# Patient Record
Sex: FEMALE | Race: WHITE | NOT HISPANIC OR LATINO | Employment: OTHER | URBAN - METROPOLITAN AREA
[De-identification: names, ages, dates, MRNs, and addresses within clinical notes are randomized per-mention and may not be internally consistent; named-entity substitution may affect disease eponyms.]

---

## 2018-03-01 ENCOUNTER — HOSPITAL ENCOUNTER (OUTPATIENT)
Dept: OUTPATIENT SERVICES | Facility: HOSPITAL | Age: 68
Discharge: HOME | End: 2018-03-01
Attending: ORTHOPAEDIC SURGERY | Admitting: ORTHOPAEDIC SURGERY

## 2018-03-06 ENCOUNTER — HOSPITAL ENCOUNTER (OUTPATIENT)
Dept: PHYSICAL THERAPY | Facility: CLINIC | Age: 68
Setting detail: THERAPIES SERIES
Discharge: HOME | End: 2018-03-06
Attending: INTERNAL MEDICINE
Payer: MEDICARE

## 2018-03-06 DIAGNOSIS — R26.9 GAIT DISORDER: Primary | ICD-10-CM

## 2018-03-06 PROCEDURE — 97010 HOT OR COLD PACKS THERAPY: CPT | Mod: GP

## 2018-03-06 PROCEDURE — 97110 THERAPEUTIC EXERCISES: CPT | Mod: GP

## 2018-03-06 PROCEDURE — 97140 MANUAL THERAPY 1/> REGIONS: CPT | Mod: GP

## 2018-03-06 RX ORDER — ROSUVASTATIN CALCIUM 10 MG/1
10 TABLET, COATED ORAL DAILY
COMMUNITY
End: 2023-05-11

## 2018-03-06 NOTE — PROGRESS NOTES
Patient: Juana Bagley  [facility]    MRN: [id number]  Today's date: 3/6/2018          S: Began water therapy. One hour with only c/o fatigue, no pain  Called Fabian Cary and await call back regarding shoe lift          Treatment Summary - 03/06/18 1400        Session Details    Document Type daily treatment/progress note    Mode of Treatment individual therapy       Time Calculation    Start Time 1400    Stop Time 1500    Time Calculation (min) 60 min        O:TE 35 mins (no group)  Bike 10 mins  Calf S 3x  Ham S 3x  St ABD with orange 20x  St EXT with orange 20x  Supine clam Pink 5 sec x20  Supine ADD 10  Hip ER S 3x  Butterfly S 3x  Sam S  (with PPT) 3x  PROM: left hip, right leg distraction 8 mins  CP to left hip and L/S spine 10 mins  A: Patient with continued increased ADL  P: Cont 2x a week, check on heel lift progress

## 2018-03-08 ENCOUNTER — HOSPITAL ENCOUNTER (OUTPATIENT)
Dept: PHYSICAL THERAPY | Facility: CLINIC | Age: 68
Setting detail: THERAPIES SERIES
Discharge: HOME | End: 2018-03-08
Attending: INTERNAL MEDICINE
Payer: MEDICARE

## 2018-03-08 DIAGNOSIS — R26.9 GAIT DISORDER: Primary | ICD-10-CM

## 2018-03-08 PROCEDURE — 97110 THERAPEUTIC EXERCISES: CPT | Mod: GP

## 2018-03-08 PROCEDURE — 97140 MANUAL THERAPY 1/> REGIONS: CPT | Mod: GP

## 2018-03-08 PROCEDURE — 97010 HOT OR COLD PACKS THERAPY: CPT | Mod: GP

## 2018-03-08 NOTE — PROGRESS NOTES
"Patient: Juana Bagley  [facility]    MRN: [id number]  Today's date: 3/8/2018       S: I called Fabian Cary and await call back. Think there is something wrong the answering machine. I called yesterday. No c/o pain today. I can walk reciprocally up half of my steps (10) then need to switch to step to gait for the second half. This is usually when I am tired. I went to the pool today and am here now. It is not too much in the same day.             Treatment Summary - 03/08/18 1500        Session Details    Document Type daily treatment/progress note    Mode of Treatment individual therapy       Time Calculation    Start Time 1500    Stop Time 1600    Time Calculation (min) 60 min                     THERE EX 35 mins  Bike 10 mins  Calf S 3x  Ham S 3x  St ABD with green 20x  St EXT green 20x  Fwd step ups 6\" 10x  Supine clam green x20  Supine ADD 10x  Hip ER S 3x  Butterfly S 3x  Sam S  (with PPT) 3x    Manuals: 10 mins  PROM: left hip, right leg distraction     CP to left hip and L/S spine 10 mins    A: Increased standing Tband to the next color, green, without c/c.Additionally, increased supine clam to green without pain.     P: Cont 2x a week, check on heel lift progress  "

## 2018-03-13 ENCOUNTER — HOSPITAL ENCOUNTER (OUTPATIENT)
Dept: PHYSICAL THERAPY | Facility: CLINIC | Age: 68
Setting detail: THERAPIES SERIES
Discharge: HOME | End: 2018-03-13
Attending: INTERNAL MEDICINE
Payer: MEDICARE

## 2018-03-13 DIAGNOSIS — M16.12 UNILATERAL PRIMARY OSTEOARTHRITIS, LEFT HIP: Primary | ICD-10-CM

## 2018-03-13 DIAGNOSIS — R26.2 DIFFICULTY IN WALKING, NOT ELSEWHERE CLASSIFIED: ICD-10-CM

## 2018-03-13 PROCEDURE — 97110 THERAPEUTIC EXERCISES: CPT | Mod: GP

## 2018-03-13 PROCEDURE — 97010 HOT OR COLD PACKS THERAPY: CPT | Mod: GP

## 2018-03-13 NOTE — PROGRESS NOTES
"Patient: Juana Bagley  Location: [facility]    MRN: [id number]  Today's date: 3/13/2018          Pain/Vitals - 03/13/18 1400        Pain/Comfort/Sleep    Presence of Pain denies    Preferred Pain Scale number (Numeric Rating Pain Scale)    Pain Body Location - Side Left    Pain Rating (0-10): Rest 0    Pain Rating (0-10): Activity 0                     Treatment Summary - 03/13/18 1500        LE Standing Therapeutic Exercise    Exercise(s) Performed hip extension;hip abduction;other (see comments)   step ups, HSS, calf S       LE Supine Therapeutic Exercise    Exercise(s) Performed other (see comments)   Butterfly S, supine clamshells       Aerobic Exercise Activity    Exercise(s) Performed stationary bike    Time 10                 “I seem to be getting more balanced with walking. Overall the trend is getting better I think, maybe im getting used to the different leg lengths.”  Patient reports no pain at beginning of session.    Activity as follows: Bike 10 mins, Calf S ([id number]), Ham S (2n55hbu), St ABD/EXT (with GTB, 2x10), Fwd/lateral step ups 6\" (10x), Supine clam green (x20), Supine ADD (10x), Butterfly S ([id number]).  Fabian Cary from [facility] is present in clinic with another patient, and since the patient and Mr. Cary have not been able to otherwise connect by phone, took time during patient session for the two to discuss.  Rest of activity sheet outlined (but not performed today due to time constraints): Hip ER S ([id number]), Sam S with PPT (4c30fmm).     Manuals (typically performed): PROM L hip and LAD.  Not performed this session due to time constraints.    Patient continues to move through session without significant issue.  Appears to be appropriately challenged by progression to GTB. May benefit from additional set of step ups next session or progression to 8in step due to patient height.     She is going away for a week starting tomorrow and returning " 3/20/18. When she returns she will bring her new sneakers and the script from the MD and then Fabian Cary will be able to do the lift.  Encouraged patient to continue with HEP as prescribed by primary PT.    Patient reports no pain post session, 0/10.

## 2018-03-20 PROCEDURE — 97010 HOT OR COLD PACKS THERAPY: CPT | Mod: GP

## 2018-03-23 ENCOUNTER — HOSPITAL ENCOUNTER (OUTPATIENT)
Dept: PHYSICAL THERAPY | Facility: CLINIC | Age: 68
Setting detail: THERAPIES SERIES
Discharge: HOME | End: 2018-03-23
Attending: ORTHOPAEDIC SURGERY
Payer: MEDICARE

## 2018-03-23 DIAGNOSIS — R26.9 GAIT DISORDER: Primary | ICD-10-CM

## 2018-03-23 PROCEDURE — 97110 THERAPEUTIC EXERCISES: CPT | Mod: GP

## 2018-03-23 PROCEDURE — 97010 HOT OR COLD PACKS THERAPY: CPT | Mod: GP

## 2018-03-23 PROCEDURE — 97140 MANUAL THERAPY 1/> REGIONS: CPT | Mod: GP

## 2018-03-23 NOTE — PROGRESS NOTES
"Patient: Juana Bagley  Location: [facility] [place] - Physical Therapy    MRN: [id number]  Today's date: 3/23/2018         Prior Living Environment            Prior Level of Function              PT Treatment Summary - 03/13/18 1500        LE Standing Therapeutic Exercise    Exercise(s) Performed hip extension;hip abduction;other (see comments)   step ups, HSS, calf S       LE Supine Therapeutic Exercise    Exercise(s) Performed other (see comments)   Butterfly S, supine clamshells       Aerobic Exercise Activity    Exercise(s) Performed stationary bike    Time 10                   Education provided this session. See the Patient Education summary report for full details.       Goals     None         S: I have been on vacation. I have not dropped off a shoe for Fabian Cary. I will bring one next week. I feel off balance but do not have pain.     THER EX 35 mins  Bike 10 mins  Calf S 3x  Ham S 3x  St ABD with green 20x  St EXT green 20x    Lateral step ups 6\" L - held due to pain  Fwd step ups 6\" 10x    S/L clam with pink x20  S/L hip abd (no weight) 10x    Supine ADD 10x  Supine ITB S with belt (hip precautions removed)  Hip ER S 3x  Butterfly S 3x    CP to left hip 10 mins     Manuals: 10 mins  PROM: left hip, right leg distraction     A: progressed clamshell to S/L and unable to perform with green. Performed with pink in minimal range. Gait with genu valgus . Gave S/L Abd for HEp    P: Continue to increase strength     "

## 2018-03-28 ENCOUNTER — HOSPITAL ENCOUNTER (OUTPATIENT)
Dept: PHYSICAL THERAPY | Facility: CLINIC | Age: 68
Setting detail: THERAPIES SERIES
Discharge: HOME | End: 2018-03-28
Attending: ORTHOPAEDIC SURGERY
Payer: MEDICARE

## 2018-03-28 DIAGNOSIS — M16.12 UNILATERAL PRIMARY OSTEOARTHRITIS, LEFT HIP: ICD-10-CM

## 2018-03-28 DIAGNOSIS — R26.2 DIFFICULTY IN WALKING, NOT ELSEWHERE CLASSIFIED: ICD-10-CM

## 2018-03-28 PROCEDURE — 97010 HOT OR COLD PACKS THERAPY: CPT | Mod: GP

## 2018-03-28 PROCEDURE — 97110 THERAPEUTIC EXERCISES: CPT | Mod: GP

## 2018-03-28 PROCEDURE — 97140 MANUAL THERAPY 1/> REGIONS: CPT | Mod: GP

## 2018-03-28 NOTE — PROGRESS NOTES
"Patient: Juana Bagley  [facility]    MRN: [id number]  Today's date: 3/28/2018        Prior Living Environment            Prior Level of Function              PT Treatment Summary - 03/28/18 1700        Session Details    Document Type daily treatment    Mode of Treatment individual therapy;physical therapy    Patient/Family Observations see note for pt subjective       Time Calculation    Start Time 1631    Stop Time 1733    Time Calculation (min) 62 min       Range of Motion (ROM)    Comment, General Range of Motion Manuals: PROM L hip, LAD LLE       LE Standing Therapeutic Exercise    Exercise(s) Performed hip extension;hip abduction;other (see comments)   step ups, HSS, CS, step downs       LE Supine Therapeutic Exercise    Exercise(s) Performed other (see comments)   butterfly S, clamshells, ITB S       Aerobic Exercise Activity    Exercise(s) Performed stationary bike    Time 10       PT Clinical Impression    Plan For Care Reviewed: Physical Therapy patient voices agreement with PT plan for care;patient feedback incorporated in PT plan for care       PT Weekly Outcome Summary    Functional Goal Overall Progress progressing toward functional goals as expected                           Goals     None        Patient arrives to session reporting 0/10 pain. She went to the running company today and has cosmetic questions regarding the shoe and the lift color combination.    THER EX 35 mins  Bike 10 mins  Calf S 3x - yes  Ham S 3x - yes  St ABD with 2# 2x10 - yes  St EXT 2x10, 2# - yes     Lateral step ups 4\" L 2x10 - yes  Fwd step ups 6\" 2x10 - yes     S/L clam with pink x20 - yes  S/L hip abd (no weight) 10x - no     Supine ADD 10x 5sec hold - yes  Supine ITB S with belt (hip precautions removed) - yes  Hip ER S 3x - yes  Butterfly S 3x - yes    Manuals: 10 mins  PROM: left hip, right leg distraction  CP to left hip 10 mins     Patient moves through " session without issue.  Readdressed lateral step downs today from 4in height, as she was unable to complete 6in height last session; she is able to perform two sets of step downs at new lower height with unilateral support without issue.  Patient was performing standing hip abd and extension with GTB, but was performing with little control with pace and stability.  Switched to 2# weight and patient expresses it “feels better” and she “likes it more”; recommend continued performance with weights instead of theraband to promote better strength and stability.  She is unable to perform 2 sets of step ups without compromised form today, recommend continuing with one set at this time.    Shes familiar with most therex at this point, and expresses that she is unsure of how much more she is going to get out of sessions at this point. She is ready to move toward d/c to HEP and continuing with pool workouts. Patient also expresses a feeling of instability at knee on ipsilateral side of surgery, recommend including quad strengthening to HEP to benefit hip and knee on LLE.    Post session/ice, she reports 1/10 pain. Encouraged to rest and ice as needed at home. Encouraged patient to contact [employer] directly about the color combinations available for the shoe/lift.

## 2018-04-03 ENCOUNTER — HOSPITAL ENCOUNTER (OUTPATIENT)
Dept: PHYSICAL THERAPY | Facility: CLINIC | Age: 68
Setting detail: THERAPIES SERIES
Discharge: HOME | End: 2018-04-03
Attending: ORTHOPAEDIC SURGERY
Payer: MEDICARE

## 2018-04-03 DIAGNOSIS — R26.9 GAIT DISORDER: Primary | ICD-10-CM

## 2018-04-03 PROCEDURE — 97110 THERAPEUTIC EXERCISES: CPT | Mod: GP

## 2018-04-03 NOTE — PROGRESS NOTES
"Patient: Juana Bagley  Location: [facility] in [facility] - Physical Therapy    MRN: [id number]  Today's date: 4/3/2018         Prior Living Environment            Prior Level of Function              PT Treatment Summary - 04/03/18 1300        Session Details    Document Type daily treatment    Mode of Treatment individual therapy       Time Calculation    Start Time 1300    Stop Time 1400    Time Calculation (min) 60 min                   Education provided this session. See the Patient Education summary report for full details.       Goals     None             S: the step ups hurt me knee. I would prefer not to do them.    O:THER EX 40 mins  Bike 10 mins  Calf S 3x - yes  Ham S 3x - yes  St ABD with 2# 2x10 - yes  St EXT 2x10, 2# - yes   Supine ADD 15x 5sec hold - yes  Supine ITB S with belt (hip precautions removed) - yes    Hip ER S 3x - yes  Butterfly S 3x - yes   S/L clam with pink x20 - yes  S/L hip abd (no weight) 10x - not today   Lateral step ups 4\" L 2x10 - HOLD  Fwd step ups 6\" 2x10 - HOLD       Manuals: 10 mins- not today  PROM: left hip, right leg distraction  CP to left hip 10 mins    A: Patient with knee pain after steps last session and decined today. Limited hip ADD on surgical leg noted with ITB S today.   P: Wean towards HEP in 1-3 sessions  "

## 2018-04-10 ENCOUNTER — HOSPITAL ENCOUNTER (OUTPATIENT)
Dept: PHYSICAL THERAPY | Facility: CLINIC | Age: 68
Setting detail: THERAPIES SERIES
Discharge: HOME | End: 2018-04-10
Attending: ORTHOPAEDIC SURGERY
Payer: MEDICARE

## 2018-04-10 DIAGNOSIS — R26.2 DIFFICULTY IN WALKING, NOT ELSEWHERE CLASSIFIED: ICD-10-CM

## 2018-04-10 DIAGNOSIS — R26.9 GAIT DISORDER: Primary | ICD-10-CM

## 2018-04-10 DIAGNOSIS — M16.12 UNILATERAL PRIMARY OSTEOARTHRITIS, LEFT HIP: ICD-10-CM

## 2018-04-10 PROCEDURE — 97110 THERAPEUTIC EXERCISES: CPT | Mod: GP

## 2018-04-10 PROCEDURE — 97010 HOT OR COLD PACKS THERAPY: CPT | Mod: GP

## 2018-04-10 NOTE — PROGRESS NOTES
"Patient: Juana Bagley  [facility]    MRN: [id number]  Today's date: 4/10/2018        Prior Living Environment            Prior Level of Function              PT Treatment Summary - 04/10/18 1300        Session Details    Document Type daily treatment    Mode of Treatment individual therapy;physical therapy    Patient/Family Observations see note for pt subjective       Time Calculation    Start Time 1300    Stop Time 1350    Time Calculation (min) 50 min       LE Standing Therapeutic Exercise    Exercise(s) Performed hip extension;hip abduction;other (see comments)   HSS, calf S       LE Supine Therapeutic Exercise    Exercise(s) Performed other (see comments)   butterfly S, fig 4 S       Aerobic Exercise Activity    Exercise(s) Performed stationary bike    Time 10       PT Clinical Impression    Plan For Care Reviewed: Physical Therapy patient feedback incorporated in PT plan for care;patient voices agreement with PT plan for care;PT plan for care discussed with patient       PT Weekly Outcome Summary    Functional Goal Overall Progress progressing toward functional goals as expected           Goals     None        Patient arrives to session reporting no pain. “Did the mirian who was supposed to do my shoe pick it up yet?”  She reports nothing of significance since last session. “I still have been going to my [employer] class, I went this morning.”     THER EX 35 mins  Bike 10 mins  Calf S 3x - yes  Ham S 3x - yes  St ABD with 2# 2x10 - yes  St EXT 2x10, 2# - yes  S/L hip abd (no weight) 10x each  S/L clam with pink 2x10 - yes  Supine ADD (1x15, 5sec hold) - yes  Supine ITB S with belt, [id number] (hip precautions removed) - yes  Hip ER S fig 4, [id number] - yes  Butterfly S [id number] - yes    Lateral step ups 4\" L 2x10 - HOLD  Fwd step ups 6\" 2x10 - HOLD     Manuals: 10 mins- not today  PROM: left hip, right leg distraction  CP to left hip 10 mins     Patient moves " through session without significant issue. She is relatively independent with all therex, and only requires minimal assistance with setup for convenience. She continues to decline step ups forward/laterally today, 2/2 knee pain with performance.     Per patient declining steps ups today, discussed potential reasoning for increase in B knee pain. Suspect that increasing activity in general, along with aqu[employer] classes, and PT may increase general joint pain/stiffness. Patient in agreement that an increase in activity may be contributing to step ups being bothersome.    Continue with POC per primary PT, weaning toward d/c and HEP in coming sessions.  As patient is independent with therex and has resumed recreational exercise, she is appropriate for d/c at this time. She reports no pain post session, 0/10.    Violette Encinas, PTA

## 2018-04-12 DIAGNOSIS — R26.9 GAIT DISORDER: Primary | ICD-10-CM

## 2018-04-12 NOTE — PROGRESS NOTES
Patient: Juana Bagley  Location:      MRN: [id number]  Today's date: 4/12/2018                          S: Patient was not seen for this session. Pain was not assessed.     O: No treatment, no service, no measurements, no goal assessment was done as she was not in the clinic.     A: Patient called to cancel her appointment and R/S for May. She was called to inform her of office policy that a patient will be DC if not seen in two weeks.     P: Dc to HEP

## 2018-04-26 ENCOUNTER — APPOINTMENT (OUTPATIENT)
Dept: RADIOLOGY | Facility: HOSPITAL | Age: 68
Setting detail: HOSPITAL OUTPATIENT SURGERY
End: 2018-04-26
Attending: PSYCHIATRY & NEUROLOGY
Payer: MEDICARE

## 2018-04-26 ENCOUNTER — HOSPITAL ENCOUNTER (OUTPATIENT)
Facility: HOSPITAL | Age: 68
Setting detail: HOSPITAL OUTPATIENT SURGERY
Discharge: HOME | End: 2018-04-26
Attending: PSYCHIATRY & NEUROLOGY | Admitting: PSYCHIATRY & NEUROLOGY
Payer: MEDICARE

## 2018-04-26 VITALS
WEIGHT: 145 LBS | TEMPERATURE: 97.5 F | BODY MASS INDEX: 21.98 KG/M2 | DIASTOLIC BLOOD PRESSURE: 49 MMHG | OXYGEN SATURATION: 100 % | SYSTOLIC BLOOD PRESSURE: 104 MMHG | RESPIRATION RATE: 16 BRPM | HEIGHT: 68 IN | HEART RATE: 58 BPM

## 2018-04-26 PROCEDURE — 63600000 HC DRUGS/DETAIL CODE: Performed by: PSYCHIATRY & NEUROLOGY

## 2018-04-26 PROCEDURE — 3E0T3BZ INTRODUCTION OF ANESTHETIC AGENT INTO PERIPHERAL NERVES AND PLEXI, PERCUTANEOUS APPROACH: ICD-10-PCS | Performed by: PSYCHIATRY & NEUROLOGY

## 2018-04-26 PROCEDURE — 25000000 HC PHARMACY GENERAL: Performed by: PSYCHIATRY & NEUROLOGY

## 2018-04-26 PROCEDURE — 27200000 HC STERILE SUPPLY: Performed by: PSYCHIATRY & NEUROLOGY

## 2018-04-26 PROCEDURE — 36000002 HC OR LEVEL 2 INITIAL 30MIN: Performed by: PSYCHIATRY & NEUROLOGY

## 2018-04-26 PROCEDURE — 77003 FLUOROGUIDE FOR SPINE INJECT: CPT

## 2018-04-26 PROCEDURE — 71000002 HC PACU PHASE 2 INITIAL 30MIN: Performed by: PSYCHIATRY & NEUROLOGY

## 2018-04-26 PROCEDURE — 3E0T33Z INTRODUCTION OF ANTI-INFLAMMATORY INTO PERIPHERAL NERVES AND PLEXI, PERCUTANEOUS APPROACH: ICD-10-PCS | Performed by: PSYCHIATRY & NEUROLOGY

## 2018-04-26 RX ORDER — BUPIVACAINE HYDROCHLORIDE 2.5 MG/ML
INJECTION, SOLUTION EPIDURAL; INFILTRATION; INTRACAUDAL AS NEEDED
Status: DISCONTINUED | OUTPATIENT
Start: 2018-04-26 | End: 2018-04-26 | Stop reason: HOSPADM

## 2018-04-26 RX ORDER — CHLOROPROCAINE HYDROCHLORIDE 20 MG/ML
INJECTION, SOLUTION EPIDURAL; INFILTRATION; INTRACAUDAL; PERINEURAL AS NEEDED
Status: DISCONTINUED | OUTPATIENT
Start: 2018-04-26 | End: 2018-04-26 | Stop reason: HOSPADM

## 2018-04-26 RX ORDER — BETAMETHASONE SODIUM PHOSPHATE AND BETAMETHASONE ACETATE 3; 3 MG/ML; MG/ML
INJECTION, SUSPENSION INTRA-ARTICULAR; INTRALESIONAL; INTRAMUSCULAR; SOFT TISSUE AS NEEDED
Status: DISCONTINUED | OUTPATIENT
Start: 2018-04-26 | End: 2018-04-26 | Stop reason: HOSPADM

## 2018-04-26 ASSESSMENT — PAIN - FUNCTIONAL ASSESSMENT: PAIN_FUNCTIONAL_ASSESSMENT: NO/DENIES PAIN

## 2018-04-26 NOTE — BRIEF OP NOTE
Multilevel lumbar medial branch block under fluoroscopic guidance, bilateral [id number]    Here in the surgicenter for medial branch blocks. Pretreatment room evaluation completed. No change in symptoms. No anticoagulants or antiplatelet agents taken today. 10 system review completed stable complaint set as documented. No anticoagulants or antiplatelet agents taken today. 10 system review completed stable complaint set as documented.     1. Fluoroscopic localization  2. Lumbar Medial branch block  3. Levels and Laterality Completed bilateral [id number] with fluoroscopy     After informed consent was obtained, the patient was brought to the Fluoroscopy Suite.  The target point was identified using AP fluoroscopy in the lumbar region demonstrating the intersection of the superior articulating process, pedicle, and transverse processes at each indicated level or the target point in the groove formed by sacral ala and superior articulating process if at L5S1.   A skin wheal was placed with a #27 gauge, 1¼ inch needle using 2% Nesacaine at each treatment level. Then a 3½ inch, #22 gauge spinal needle with imposed curvature was advanced using intermittent biplanar fluoroscopy into the target points at each treatment level using intermittent lateral fluoroscopy to confirm depth.  Then Omnipaque 180 was delivered to observe for any evidence of vascular runoff.  After confirming no heme or CSF, then 1 cc mixture containing 40 mg of Depo-Medrol and 0.25% preservative-free bupivacaine was instilled and a similar manner at each bilateral [id number] levels indicated.  The 6 needles were re-styletted and withdrawn.    Estimated Blood Loss: No blood loss documented.    Specimens:                No specimens collected during this procedure.      Drains:      Implants: * No implants in log *           Complications:  None; patient tolerated the procedure well.           Disposition: PACU - hemodynamically stable.           Condition:  stable    Aurea Busby MD  Phone Number: [phone]      Surgeon(s) and Role:     * Aurea Busby MD - Primary    Anesthesia: Local    Staff:   Circulator: Michael Champagne RN  Scrub Person: Eduardo Nogueira RN  X-Ray Technologist: Ani Arredondo RTR    Estimated Blood Loss: No blood loss documented.    Specimens:                No specimens collected during this procedure.      Drains:      Implants: * No implants in log *           Complications:  None; patient tolerated the procedure well.           Disposition: PACU - hemodynamically stable.           Condition: stable    Aurea Busby MD  Phone Number: [phone]
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
## 2018-05-10 ENCOUNTER — HOSPITAL ENCOUNTER (OUTPATIENT)
Facility: HOSPITAL | Age: 68
Setting detail: HOSPITAL OUTPATIENT SURGERY
Discharge: HOME | End: 2018-05-10
Attending: PSYCHIATRY & NEUROLOGY | Admitting: PSYCHIATRY & NEUROLOGY
Payer: MEDICARE

## 2018-05-10 ENCOUNTER — APPOINTMENT (OUTPATIENT)
Dept: RADIOLOGY | Facility: HOSPITAL | Age: 68
Setting detail: HOSPITAL OUTPATIENT SURGERY
End: 2018-05-10
Attending: PSYCHIATRY & NEUROLOGY
Payer: MEDICARE

## 2018-05-10 VITALS
RESPIRATION RATE: 16 BRPM | SYSTOLIC BLOOD PRESSURE: 116 MMHG | TEMPERATURE: 98.3 F | HEART RATE: 59 BPM | OXYGEN SATURATION: 100 % | DIASTOLIC BLOOD PRESSURE: 54 MMHG

## 2018-05-10 PROCEDURE — 77003 FLUOROGUIDE FOR SPINE INJECT: CPT

## 2018-05-10 PROCEDURE — 27200000 HC STERILE SUPPLY: Performed by: PSYCHIATRY & NEUROLOGY

## 2018-05-10 PROCEDURE — 63600000 HC DRUGS/DETAIL CODE: Performed by: PSYCHIATRY & NEUROLOGY

## 2018-05-10 PROCEDURE — 3E0T33Z INTRODUCTION OF ANTI-INFLAMMATORY INTO PERIPHERAL NERVES AND PLEXI, PERCUTANEOUS APPROACH: ICD-10-PCS | Performed by: PSYCHIATRY & NEUROLOGY

## 2018-05-10 PROCEDURE — 3E0T3BZ INTRODUCTION OF ANESTHETIC AGENT INTO PERIPHERAL NERVES AND PLEXI, PERCUTANEOUS APPROACH: ICD-10-PCS | Performed by: PSYCHIATRY & NEUROLOGY

## 2018-05-10 PROCEDURE — 25000000 HC PHARMACY GENERAL: Performed by: PSYCHIATRY & NEUROLOGY

## 2018-05-10 PROCEDURE — 71000002 HC PACU PHASE 2 INITIAL 30MIN: Performed by: PSYCHIATRY & NEUROLOGY

## 2018-05-10 PROCEDURE — 36000002 HC OR LEVEL 2 INITIAL 30MIN: Performed by: PSYCHIATRY & NEUROLOGY

## 2018-05-10 RX ORDER — BUPIVACAINE HYDROCHLORIDE 2.5 MG/ML
INJECTION, SOLUTION EPIDURAL; INFILTRATION; INTRACAUDAL AS NEEDED
Status: DISCONTINUED | OUTPATIENT
Start: 2018-05-10 | End: 2018-05-10 | Stop reason: HOSPADM

## 2018-05-10 RX ORDER — CHLOROPROCAINE HYDROCHLORIDE 20 MG/ML
INJECTION, SOLUTION EPIDURAL; INFILTRATION; INTRACAUDAL; PERINEURAL AS NEEDED
Status: DISCONTINUED | OUTPATIENT
Start: 2018-05-10 | End: 2018-05-10 | Stop reason: HOSPADM

## 2018-05-10 RX ORDER — BETAMETHASONE SODIUM PHOSPHATE AND BETAMETHASONE ACETATE 3; 3 MG/ML; MG/ML
INJECTION, SUSPENSION INTRA-ARTICULAR; INTRALESIONAL; INTRAMUSCULAR; SOFT TISSUE AS NEEDED
Status: DISCONTINUED | OUTPATIENT
Start: 2018-05-10 | End: 2018-05-10 | Stop reason: HOSPADM

## 2018-05-10 ASSESSMENT — PAIN - FUNCTIONAL ASSESSMENT: PAIN_FUNCTIONAL_ASSESSMENT: NO/DENIES PAIN
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
## 2018-05-10 NOTE — OP NOTE
THERAPY     Multilevel lumbar medial branch block under fluoroscopic guidance, bilateral H1C6H0-G1      Here in the surgicenter for medial branch blocks. Pretreatment room evaluation completed. No change in symptoms. No anticoagulants or antiplatelet agents taken today. 10 system review completed stable complaint set as documented. No anticoagulants or antiplatelet agents taken today. 10 system review completed stable complaint set as documented.       1. Fluoroscopic localization   2. Lumbar Medial branch block   3. Levels and Laterality Completed bilateral [id number] with fluoroscopy       After informed consent was obtained, the patient was brought to the Fluoroscopy Suite.  The target point was identified using AP fluoroscopy in the lumbar region demonstrating the intersection of the superior articulating process, pedicle, and transverse processes at each indicated level or the target point in the groove formed by sacral ala and superior articulating process if at L5S1.   A skin wheal was placed with a #27 gauge, 1? inch needle using 2% Nesacaine at each treatment level. Then a 3? inch, #22 gauge spinal needle with imposed curvature was advanced using intermittent biplanar fluoroscopy into the target points at each treatment level using intermittent lateral fluoroscopy to confirm depth.  Then Omnipaque 180 was delivered to observe for any evidence of vascular runoff.  After confirming no heme or CSF, then 1 cc mixture containing 40 mg of Depo-Medrol and 0.25% preservative-free bupivacaine was instilled and a similar manner at each bilateral [id number] levels indicated.  The 6 needles were re-styletted and withdrawn.      Estimated Blood Loss: No blood loss documented.      Specimens:                 No specimens collected during this procedure.        Drains:        Implants: * No implants in log *             Complications:  None; patient tolerated the procedure well.             Disposition: PACU -  hemodynamically stable.             Condition: stable      Aurea Busby MD   Phone Number: [phone]         Surgeon(s) and Role:      * Aurea Busby MD - Primary      Anesthesia: Local      Staff:    Circulator: Michael Champagne RN   Scrub Person: Eduardo Nogueira RN   X-Ray Technologist: Ani Arredondo, RTR      Estimated Blood Loss: No blood loss documented.      Specimens:                 No specimens collected during this procedure.        Drains:        Implants: * No implants in log *             Complications:  None; patient tolerated the procedure well.             Disposition: PACU - hemodynamically stable.             Condition: stable      Aurea Busby MD   Phone Number: [phone]

## 2018-06-04 ENCOUNTER — TRANSCRIBE ORDERS (OUTPATIENT)
Dept: SCHEDULING | Age: 68
End: 2018-06-04

## 2018-06-04 DIAGNOSIS — M54.16 RADICULOPATHY OF LUMBAR REGION: Primary | ICD-10-CM

## 2018-06-11 ENCOUNTER — HOSPITAL ENCOUNTER (OUTPATIENT)
Dept: RADIOLOGY | Facility: CLINIC | Age: 68
Discharge: HOME | End: 2018-06-11
Attending: PSYCHIATRY & NEUROLOGY
Payer: MEDICARE

## 2018-06-11 DIAGNOSIS — M54.16 RADICULOPATHY OF LUMBAR REGION: ICD-10-CM

## 2018-07-12 ENCOUNTER — HOSPITAL ENCOUNTER (OUTPATIENT)
Facility: HOSPITAL | Age: 68
Setting detail: HOSPITAL OUTPATIENT SURGERY
Discharge: HOME | End: 2018-07-12
Attending: PSYCHIATRY & NEUROLOGY | Admitting: PSYCHIATRY & NEUROLOGY
Payer: MEDICARE

## 2018-07-12 ENCOUNTER — APPOINTMENT (OUTPATIENT)
Dept: RADIOLOGY | Facility: HOSPITAL | Age: 68
Setting detail: HOSPITAL OUTPATIENT SURGERY
End: 2018-07-12
Attending: PSYCHIATRY & NEUROLOGY
Payer: MEDICARE

## 2018-07-12 VITALS
SYSTOLIC BLOOD PRESSURE: 93 MMHG | DIASTOLIC BLOOD PRESSURE: 49 MMHG | TEMPERATURE: 96.7 F | RESPIRATION RATE: 16 BRPM | OXYGEN SATURATION: 100 % | HEART RATE: 55 BPM

## 2018-07-12 PROCEDURE — 3E0R3BZ INTRODUCTION OF ANESTHETIC AGENT INTO SPINAL CANAL, PERCUTANEOUS APPROACH: ICD-10-PCS | Performed by: PSYCHIATRY & NEUROLOGY

## 2018-07-12 PROCEDURE — 71000002 HC PACU PHASE 2 INITIAL 30MIN: Performed by: PSYCHIATRY & NEUROLOGY

## 2018-07-12 PROCEDURE — 77003 FLUOROGUIDE FOR SPINE INJECT: CPT

## 2018-07-12 PROCEDURE — 25000000 HC PHARMACY GENERAL: Performed by: PSYCHIATRY & NEUROLOGY

## 2018-07-12 PROCEDURE — 36000002 HC OR LEVEL 2 INITIAL 30MIN: Performed by: PSYCHIATRY & NEUROLOGY

## 2018-07-12 PROCEDURE — 3E0R3GC INTRODUCTION OF OTHER THERAPEUTIC SUBSTANCE INTO SPINAL CANAL, PERCUTANEOUS APPROACH: ICD-10-PCS | Performed by: PSYCHIATRY & NEUROLOGY

## 2018-07-12 PROCEDURE — 63600000 HC DRUGS/DETAIL CODE: Performed by: PSYCHIATRY & NEUROLOGY

## 2018-07-12 PROCEDURE — 27200000 HC STERILE SUPPLY: Performed by: PSYCHIATRY & NEUROLOGY

## 2018-07-12 PROCEDURE — 63600105 HC IODINE BASED CONTRAST: Mod: JW | Performed by: PSYCHIATRY & NEUROLOGY

## 2018-07-12 PROCEDURE — 3E0R33Z INTRODUCTION OF ANTI-INFLAMMATORY INTO SPINAL CANAL, PERCUTANEOUS APPROACH: ICD-10-PCS | Performed by: PSYCHIATRY & NEUROLOGY

## 2018-07-12 RX ORDER — BETAMETHASONE SODIUM PHOSPHATE AND BETAMETHASONE ACETATE 3; 3 MG/ML; MG/ML
INJECTION, SUSPENSION INTRA-ARTICULAR; INTRALESIONAL; INTRAMUSCULAR; SOFT TISSUE AS NEEDED
Status: DISCONTINUED | OUTPATIENT
Start: 2018-07-12 | End: 2018-07-12 | Stop reason: HOSPADM

## 2018-07-12 RX ORDER — BUPIVACAINE HYDROCHLORIDE 2.5 MG/ML
INJECTION, SOLUTION EPIDURAL; INFILTRATION; INTRACAUDAL AS NEEDED
Status: DISCONTINUED | OUTPATIENT
Start: 2018-07-12 | End: 2018-07-12 | Stop reason: HOSPADM

## 2018-07-12 RX ORDER — CHLOROPROCAINE HYDROCHLORIDE 20 MG/ML
INJECTION, SOLUTION EPIDURAL; INFILTRATION; INTRACAUDAL; PERINEURAL AS NEEDED
Status: DISCONTINUED | OUTPATIENT
Start: 2018-07-12 | End: 2018-07-12 | Stop reason: HOSPADM

## 2018-07-12 ASSESSMENT — PAIN - FUNCTIONAL ASSESSMENT: PAIN_FUNCTIONAL_ASSESSMENT: NO/DENIES PAIN
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
## 2018-07-12 NOTE — BRIEF OP NOTE
FER Transforaminal Left L4,5 S1 (L) Procedure Note    Procedure:    FER Transforaminal Left L4,5 S1  CPT(R) Code:  17173 - [place] INJECT ANES/STEROID FORAMEN LUMBAR/SACRAL W IMG GUIDE ,3 LEVEL      * No Diagnosis Codes entered *       * No Diagnosis Codes entered *    Surgeon(s) and Role:     * Aurea Busby MD - Primary    Anesthesia: Local    Staff:   No surgical staff documented.    Procedure Details   FER Transforaminal  Left L4,5 S1 (R) Procedure Note    Procedure:    FER Transforaminal  Left 4,5 S1  “LFTFRIGHT”  Lumbar Transforaminal Epidural Steroid Injection/Selective Nerve Root Block under Fluoroscopy LeftL4, L5,S1 3 levels    The patient is here in the surgicenter for lumbar multilevel transforaminal selective nerve root blocks with corticosteroid. Pretreatment room evaluation completed. No change in symptoms. No anticoagulants or antiplatelet agents taken today. 10 system review completed stable complaint set as documented.     Procedure:  Lumbar Transforaminal Epidural Steroid Injection/Selective Nerve Root Block under Fluoroscopy  1. Fluoroscopic localization  2. Lumbar Transforaminal Epidural Steroid Injection/Selective Nerve Root Block  3. Levels and Laterality Completed left  l45S1 with fluoroscopy    After full written consent was obtained, the patient was escorted to the fluoroscopy suite and placed in a prone position.  A left-sided oblique fluoroscopic view was obtained with the superior articular process of L5 aligned with the pedicle of L4.  The skin over the intended target site at the 6 o'clock position of the L4 pedicle was marked, prepped with povidone-iodine three times and draped in a sterile fashion.  With sterile technique, the skin and subcutaneous tissue were anesthetized with 2% Nesacaine.  Next, the tip of a 22 gauge, 3-1/2 inch Quincke-type spinal needle was advanced toward the 6 o'clock position of the inferior lateral portion of the L4 pedicle under intermittent fluoroscopic  guidance.  Confirmation of the proper needle position was made with bi-planar fluoroscopy including AP, oblique and lateral views.  After negative aspiration for blood and cerebrospinal fluid, 1 cc of Omnipaque 180 was injected.  Fluoroscopic imaging reveals a clear outline of the L4 spinal nerve root on the left side with proximal spread of agent through the neural foramina into the anterior epidural space.  Subsequently 2 cc of injectant containing 1 cc of 0.25% preservative-free bupivacaine and 6mg of celestone was administered.  The spinal needle was then re-styletted and removed after plain radiographs were obtained in all views.  This was repeated for the following levels left  Lumbar L45S1 with fluoroscopy.  The patient tolerated the procedure well and without complaint.  There was no evidence of procedural complications.  There was no reported numbness or motor dysfunction of the leg.    The patient was observed in the PACU The patient was discharged to home.  Follow up in two weeks      Estimated Blood Loss: No blood loss documented.    Specimens:                No specimens collected during this procedure.      Drains:      Implants: * No implants in log *           Complications:  None; patient tolerated the procedure well.           Disposition: PACU - hemodynamically stable.           Condition: stable    Aurea Busby MD  Phone Number: [phone]    Estimated Blood Loss: No blood loss documented.    Specimens:                No specimens collected during this procedure.      Drains:      Implants: * No implants in log *           Complications:  None; patient tolerated the procedure well.           Disposition: PACU - hemodynamically stable.           Condition: stable    Aurea Busby MD  Phone Number: [phone]          Estimated Blood Loss: No blood loss documented.    Specimens:                No specimens collected during this procedure.      Drains:      Implants: * No implants in log  *           Complications:  None; patient tolerated the procedure well.           Disposition: PACU - hemodynamically stable.           Condition: stable    Aurea Busby MD  Phone Number: [phone]

## 2018-07-19 ENCOUNTER — HOSPITAL ENCOUNTER (OUTPATIENT)
Facility: HOSPITAL | Age: 68
Setting detail: HOSPITAL OUTPATIENT SURGERY
Discharge: HOME | End: 2018-07-19
Attending: PSYCHIATRY & NEUROLOGY | Admitting: PSYCHIATRY & NEUROLOGY
Payer: MEDICARE

## 2018-07-19 ENCOUNTER — APPOINTMENT (OUTPATIENT)
Dept: RADIOLOGY | Facility: HOSPITAL | Age: 68
Setting detail: HOSPITAL OUTPATIENT SURGERY
End: 2018-07-19
Attending: PSYCHIATRY & NEUROLOGY
Payer: MEDICARE

## 2018-07-19 VITALS
WEIGHT: 148 LBS | DIASTOLIC BLOOD PRESSURE: 38 MMHG | HEIGHT: 68 IN | SYSTOLIC BLOOD PRESSURE: 104 MMHG | OXYGEN SATURATION: 100 % | BODY MASS INDEX: 22.43 KG/M2 | RESPIRATION RATE: 16 BRPM | HEART RATE: 56 BPM | TEMPERATURE: 97.5 F

## 2018-07-19 PROCEDURE — 25000000 HC PHARMACY GENERAL: Performed by: PSYCHIATRY & NEUROLOGY

## 2018-07-19 PROCEDURE — 71000002 HC PACU PHASE 2 INITIAL 30MIN: Performed by: PSYCHIATRY & NEUROLOGY

## 2018-07-19 PROCEDURE — 27200000 HC STERILE SUPPLY: Performed by: PSYCHIATRY & NEUROLOGY

## 2018-07-19 PROCEDURE — 77003 FLUOROGUIDE FOR SPINE INJECT: CPT

## 2018-07-19 PROCEDURE — 3E0R3BZ INTRODUCTION OF ANESTHETIC AGENT INTO SPINAL CANAL, PERCUTANEOUS APPROACH: ICD-10-PCS | Performed by: PSYCHIATRY & NEUROLOGY

## 2018-07-19 PROCEDURE — 63600000 HC DRUGS/DETAIL CODE: Performed by: PSYCHIATRY & NEUROLOGY

## 2018-07-19 PROCEDURE — 63600105 HC IODINE BASED CONTRAST: Mod: JW | Performed by: PSYCHIATRY & NEUROLOGY

## 2018-07-19 PROCEDURE — 3E0R33Z INTRODUCTION OF ANTI-INFLAMMATORY INTO SPINAL CANAL, PERCUTANEOUS APPROACH: ICD-10-PCS | Performed by: PSYCHIATRY & NEUROLOGY

## 2018-07-19 PROCEDURE — 36000002 HC OR LEVEL 2 INITIAL 30MIN: Performed by: PSYCHIATRY & NEUROLOGY

## 2018-07-19 RX ORDER — BETAMETHASONE SODIUM PHOSPHATE AND BETAMETHASONE ACETATE 3; 3 MG/ML; MG/ML
INJECTION, SUSPENSION INTRA-ARTICULAR; INTRALESIONAL; INTRAMUSCULAR; SOFT TISSUE AS NEEDED
Status: DISCONTINUED | OUTPATIENT
Start: 2018-07-19 | End: 2018-07-19 | Stop reason: HOSPADM

## 2018-07-19 RX ORDER — BUPIVACAINE HYDROCHLORIDE 2.5 MG/ML
INJECTION, SOLUTION EPIDURAL; INFILTRATION; INTRACAUDAL AS NEEDED
Status: DISCONTINUED | OUTPATIENT
Start: 2018-07-19 | End: 2018-07-19 | Stop reason: HOSPADM

## 2018-07-19 RX ORDER — CHLOROPROCAINE HYDROCHLORIDE 20 MG/ML
INJECTION, SOLUTION EPIDURAL; INFILTRATION; INTRACAUDAL; PERINEURAL AS NEEDED
Status: DISCONTINUED | OUTPATIENT
Start: 2018-07-19 | End: 2018-07-19 | Stop reason: HOSPADM

## 2018-07-19 ASSESSMENT — PAIN - FUNCTIONAL ASSESSMENT: PAIN_FUNCTIONAL_ASSESSMENT: NO/DENIES PAIN

## 2018-07-19 NOTE — BRIEF OP NOTE
FER Transforaminal Left L4,5 S1 (L) Procedure Note    Procedure:    FER Transforaminal Left L4,5 S1  CPT(R) Code:  69322 - [place] INJECT ANES/STEROID FORAMEN LUMBAR/SACRAL W IMG GUIDE  * Aurea Busby MD - Primary    Anesthesia: Local    Staff:   No surgical staff documented.    Procedure Details   THERAPY   FER Transforaminal Left L4,5 S1 (L) Procedure Note    Procedure:    FER Transforaminal Left L4,5 S1  CPT(R) Code:  06064 - [place] INJECT ANES/STEROID FORAMEN LUMBAR/SACRAL W IMG GUIDE ,3 LEVEL    Surgeon(s) and Role:     * Aurea Busby MD - Primary    Anesthesia: Local    Staff:   No surgical staff documented.    Procedure Details   FER Transforaminal  Left L4,5 S1 (R) Procedure Note    Procedure:    FER Transforaminal  Left 4,5 S1  “Lumbar Transforaminal Epidural Steroid Injection/Selective Nerve Root Block under Fluoroscopy Left L4, L5,S1 3 levels    The patient is here in the surgicenter for lumbar multilevel transforaminal selective nerve root blocks with corticosteroid. Pretreatment room evaluation completed. No change in symptoms. No anticoagulants or antiplatelet agents taken today. 10 system review completed stable complaint set as documented.     Procedure:  Lumbar Transforaminal Epidural Steroid Injection/Selective Nerve Root Block under Fluoroscopy  1. Fluoroscopic localization  2. Lumbar Transforaminal Epidural Steroid Injection/Selective Nerve Root Block  3. Levels and Laterality Completed left  l45S1 with fluoroscopy    After full written consent was obtained, the patient was escorted to the fluoroscopy suite and placed in a prone position.  A left-sided oblique fluoroscopic view was obtained with the superior articular process of L5 aligned with the pedicle of L4.  The skin over the intended target site at the 6 o'clock position of the L4 pedicle was marked, prepped with povidone-iodine three times and draped in a sterile fashion.  With sterile technique, the skin and subcutaneous tissue were  anesthetized with 2% Nesacaine.  Next, the tip of a 22 gauge, 3-1/2 inch Quincke-type spinal needle was advanced toward the 6 o'clock position of the inferior lateral portion of the L4 pedicle under intermittent fluoroscopic guidance.  Confirmation of the proper needle position was made with bi-planar fluoroscopy including AP, oblique and lateral views.  After negative aspiration for blood and cerebrospinal fluid, 1 cc of Omnipaque 180 was injected.  Fluoroscopic imaging reveals a clear outline of the L4 spinal nerve root on the left side with proximal spread of agent through the neural foramina into the anterior epidural space.  Subsequently 2 cc of injectant containing 1 cc of 0.25% preservative-free bupivacaine and 6mg of celestone was administered.  The spinal needle was then re-styletted and removed after plain radiographs were obtained in all views.  This was repeated for the following levels left  Lumbar L45S1 with fluoroscopy.  The patient tolerated the procedure well and without complaint.  There was no evidence of procedural complications.  There was no reported numbness or motor dysfunction of the leg.    The patient was observed in the PACU The patient was discharged to home.  Follow up in two weeks      Estimated Blood Loss: No blood loss documented.    Specimens:                No specimens collected during this procedure.      Drains:      Implants: * No implants in log *           Complications:  None; patient tolerated the procedure well.           Disposition: PACU - hemodynamically stable.           Condition: stable    Aurea Busby MD
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
## 2019-01-09 ENCOUNTER — HOSPITAL ENCOUNTER (OUTPATIENT)
Dept: RADIOLOGY | Facility: CLINIC | Age: 69
Discharge: HOME | End: 2019-01-09
Attending: INTERNAL MEDICINE
Payer: MEDICARE

## 2019-01-09 ENCOUNTER — TRANSCRIBE ORDERS (OUTPATIENT)
Dept: RADIOLOGY | Facility: CLINIC | Age: 69
End: 2019-01-09

## 2019-01-09 DIAGNOSIS — Z12.31 ENCOUNTER FOR SCREENING MAMMOGRAM FOR MALIGNANT NEOPLASM OF BREAST: ICD-10-CM

## 2019-01-09 DIAGNOSIS — Z12.31 ENCOUNTER FOR SCREENING MAMMOGRAM FOR MALIGNANT NEOPLASM OF BREAST: Primary | ICD-10-CM

## 2019-01-09 PROCEDURE — 77063 BREAST TOMOSYNTHESIS BI: CPT

## 2019-02-04 ENCOUNTER — TRANSCRIBE ORDERS (OUTPATIENT)
Dept: SCHEDULING | Age: 69
End: 2019-02-04

## 2019-02-04 DIAGNOSIS — M48.061 SPINAL STENOSIS OF LUMBAR REGION WITHOUT NEUROGENIC CLAUDICATION: Primary | ICD-10-CM

## 2019-02-04 DIAGNOSIS — M48.04 SPINAL STENOSIS OF THORACIC REGION: ICD-10-CM

## 2019-02-08 ENCOUNTER — HOSPITAL ENCOUNTER (OUTPATIENT)
Dept: RADIOLOGY | Facility: CLINIC | Age: 69
Discharge: HOME | End: 2019-02-08
Attending: PSYCHIATRY & NEUROLOGY
Payer: MEDICARE

## 2019-02-08 DIAGNOSIS — M48.04 SPINAL STENOSIS OF THORACIC REGION: ICD-10-CM

## 2019-02-08 DIAGNOSIS — M48.061 SPINAL STENOSIS OF LUMBAR REGION WITHOUT NEUROGENIC CLAUDICATION: ICD-10-CM

## 2019-03-14 ENCOUNTER — HOSPITAL ENCOUNTER (OUTPATIENT)
Facility: HOSPITAL | Age: 69
Setting detail: HOSPITAL OUTPATIENT SURGERY
Discharge: HOME | End: 2019-03-14
Attending: PSYCHIATRY & NEUROLOGY | Admitting: PSYCHIATRY & NEUROLOGY
Payer: MEDICARE

## 2019-03-14 ENCOUNTER — APPOINTMENT (OUTPATIENT)
Dept: RADIOLOGY | Facility: HOSPITAL | Age: 69
Setting detail: HOSPITAL OUTPATIENT SURGERY
End: 2019-03-14
Attending: PSYCHIATRY & NEUROLOGY
Payer: MEDICARE

## 2019-03-14 VITALS
SYSTOLIC BLOOD PRESSURE: 106 MMHG | HEART RATE: 60 BPM | OXYGEN SATURATION: 98 % | RESPIRATION RATE: 14 BRPM | DIASTOLIC BLOOD PRESSURE: 53 MMHG | TEMPERATURE: 98.1 F

## 2019-03-14 PROCEDURE — 27200000 HC STERILE SUPPLY: Performed by: PSYCHIATRY & NEUROLOGY

## 2019-03-14 PROCEDURE — 77003 FLUOROGUIDE FOR SPINE INJECT: CPT

## 2019-03-14 PROCEDURE — 3E0T33Z INTRODUCTION OF ANTI-INFLAMMATORY INTO PERIPHERAL NERVES AND PLEXI, PERCUTANEOUS APPROACH: ICD-10-PCS | Performed by: PSYCHIATRY & NEUROLOGY

## 2019-03-14 PROCEDURE — BR161ZZ FLUOROSCOPY OF LUMBAR FACET JOINT(S) USING LOW OSMOLAR CONTRAST: ICD-10-PCS | Performed by: PSYCHIATRY & NEUROLOGY

## 2019-03-14 PROCEDURE — 71000002 HC PACU PHASE 2 INITIAL 30MIN: Performed by: PSYCHIATRY & NEUROLOGY

## 2019-03-14 PROCEDURE — 3E0T3BZ INTRODUCTION OF ANESTHETIC AGENT INTO PERIPHERAL NERVES AND PLEXI, PERCUTANEOUS APPROACH: ICD-10-PCS | Performed by: PSYCHIATRY & NEUROLOGY

## 2019-03-14 PROCEDURE — 63600000 HC DRUGS/DETAIL CODE: Performed by: PSYCHIATRY & NEUROLOGY

## 2019-03-14 PROCEDURE — 25000000 HC PHARMACY GENERAL: Performed by: PSYCHIATRY & NEUROLOGY

## 2019-03-14 PROCEDURE — 36000002 HC OR LEVEL 2 INITIAL 30MIN: Performed by: PSYCHIATRY & NEUROLOGY

## 2019-03-14 RX ORDER — CHLOROPROCAINE HYDROCHLORIDE 20 MG/ML
INJECTION, SOLUTION EPIDURAL; INFILTRATION; INTRACAUDAL; PERINEURAL AS NEEDED
Status: DISCONTINUED | OUTPATIENT
Start: 2019-03-14 | End: 2019-03-14 | Stop reason: HOSPADM

## 2019-03-14 RX ORDER — BETAMETHASONE SODIUM PHOSPHATE AND BETAMETHASONE ACETATE 3; 3 MG/ML; MG/ML
INJECTION, SUSPENSION INTRA-ARTICULAR; INTRALESIONAL; INTRAMUSCULAR; SOFT TISSUE AS NEEDED
Status: DISCONTINUED | OUTPATIENT
Start: 2019-03-14 | End: 2019-03-14 | Stop reason: HOSPADM

## 2019-03-14 RX ORDER — BUPIVACAINE HYDROCHLORIDE 2.5 MG/ML
INJECTION, SOLUTION EPIDURAL; INFILTRATION; INTRACAUDAL AS NEEDED
Status: DISCONTINUED | OUTPATIENT
Start: 2019-03-14 | End: 2019-03-14 | Stop reason: HOSPADM

## 2019-03-14 NOTE — BRIEF OP NOTE
FER Medial Branch Block B/L L3,4,5 (B) Procedure Note    Procedure:    FER Medial Branch Block B/L L3,4,5  CPT(R) Code:  42283 - UT INJ DX/THER AGNT PARAVERT FACET JOINT,IMG GUIDE,LUMBAR/SAC      Pre-op Diagnosis     * Degenerative lumbar disc [M51.36]       Post-op Diagnosis     * Degenerative lumbar disc [M51.36]    Surgeon(s) and Role:     * Aurea Busby MD - Primary    Anesthesia: Local    Staff:   No surgical staff documented.    Procedure Details Multilevel lumbar medial branch block under fluoroscopic guidance, bilateral O5H5T3-O0    Here in the surgicenter for medial branch blocks. Pretreatment room evaluation completed. No change in symptoms. No anticoagulants or antiplatelet agents taken today. 10 system review completed stable complaint set as documented. No anticoagulants or antiplatelet agents taken today. 10 system review completed stable complaint set as documented.     1. Fluoroscopic localization  2. Lumbar Medial branch block  3. Levels and Laterality Completed bilateral [id number]-E1 with fluoroscopy     After informed consent was obtained, the patient was brought to the Fluoroscopy Suite.  The target point was identified using AP fluoroscopy in the lumbar region demonstrating the intersection of the superior articulating process, pedicle, and transverse processes at each indicated level or the target point in the groove formed by sacral ala and superior articulating process if at L5S1.   A skin wheal was placed with a #27 gauge, 1¼ inch needle using 2% Nesacaine at each treatment level. Then a 3½ inch, #22 gauge spinal needle with imposed curvature was advanced using intermittent biplanar fluoroscopy into the target points at each treatment level using intermittent lateral fluoroscopy to confirm depth.  Then Omnipaque 180 was delivered to observe for any evidence of vascular runoff.  After confirming no heme or CSF, then 1 cc mixture containing 40 mg of Depo-Medrol and 0.25% preservative-free  bupivacaine was instilled and a similar manner at each bilateral Z9C6C1-U9 levels indicated.  The 6 needles were re-styletted and withdrawn.      Estimated Blood Loss: No blood loss documented.    Specimens:                No specimens collected during this procedure.      Drains:      Implants: * No implants in log *           Complications:  None; patient tolerated the procedure well.           Disposition: PACU - hemodynamically stable.           Condition: stable    Aurea Busby MD  Phone Number: [phone]
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
## 2019-03-28 ENCOUNTER — HOSPITAL ENCOUNTER (OUTPATIENT)
Facility: HOSPITAL | Age: 69
Setting detail: HOSPITAL OUTPATIENT SURGERY
Discharge: HOME | End: 2019-03-28
Attending: PSYCHIATRY & NEUROLOGY | Admitting: PSYCHIATRY & NEUROLOGY
Payer: MEDICARE

## 2019-03-28 ENCOUNTER — APPOINTMENT (OUTPATIENT)
Dept: RADIOLOGY | Facility: HOSPITAL | Age: 69
Setting detail: HOSPITAL OUTPATIENT SURGERY
End: 2019-03-28
Attending: PSYCHIATRY & NEUROLOGY
Payer: MEDICARE

## 2019-03-28 VITALS
SYSTOLIC BLOOD PRESSURE: 94 MMHG | RESPIRATION RATE: 14 BRPM | DIASTOLIC BLOOD PRESSURE: 58 MMHG | TEMPERATURE: 98.1 F | HEART RATE: 58 BPM

## 2019-03-28 PROCEDURE — 36000002 HC OR LEVEL 2 INITIAL 30MIN: Performed by: PSYCHIATRY & NEUROLOGY

## 2019-03-28 PROCEDURE — 3E0T33Z INTRODUCTION OF ANTI-INFLAMMATORY INTO PERIPHERAL NERVES AND PLEXI, PERCUTANEOUS APPROACH: ICD-10-PCS | Performed by: PSYCHIATRY & NEUROLOGY

## 2019-03-28 PROCEDURE — 27200000 HC STERILE SUPPLY: Performed by: PSYCHIATRY & NEUROLOGY

## 2019-03-28 PROCEDURE — 63600000 HC DRUGS/DETAIL CODE: Performed by: PSYCHIATRY & NEUROLOGY

## 2019-03-28 PROCEDURE — 25000000 HC PHARMACY GENERAL: Performed by: PSYCHIATRY & NEUROLOGY

## 2019-03-28 PROCEDURE — 3E0T3BZ INTRODUCTION OF ANESTHETIC AGENT INTO PERIPHERAL NERVES AND PLEXI, PERCUTANEOUS APPROACH: ICD-10-PCS | Performed by: PSYCHIATRY & NEUROLOGY

## 2019-03-28 PROCEDURE — BR161ZZ FLUOROSCOPY OF LUMBAR FACET JOINT(S) USING LOW OSMOLAR CONTRAST: ICD-10-PCS | Performed by: PSYCHIATRY & NEUROLOGY

## 2019-03-28 PROCEDURE — 71000002 HC PACU PHASE 2 INITIAL 30MIN: Performed by: PSYCHIATRY & NEUROLOGY

## 2019-03-28 RX ORDER — BUPIVACAINE HYDROCHLORIDE 2.5 MG/ML
INJECTION, SOLUTION EPIDURAL; INFILTRATION; INTRACAUDAL AS NEEDED
Status: DISCONTINUED | OUTPATIENT
Start: 2019-03-28 | End: 2019-03-28 | Stop reason: HOSPADM

## 2019-03-28 RX ORDER — BETAMETHASONE SODIUM PHOSPHATE AND BETAMETHASONE ACETATE 3; 3 MG/ML; MG/ML
INJECTION, SUSPENSION INTRA-ARTICULAR; INTRALESIONAL; INTRAMUSCULAR; SOFT TISSUE AS NEEDED
Status: DISCONTINUED | OUTPATIENT
Start: 2019-03-28 | End: 2019-03-28 | Stop reason: HOSPADM

## 2019-03-28 RX ORDER — CHLOROPROCAINE HYDROCHLORIDE 20 MG/ML
INJECTION, SOLUTION EPIDURAL; INFILTRATION; INTRACAUDAL; PERINEURAL AS NEEDED
Status: DISCONTINUED | OUTPATIENT
Start: 2019-03-28 | End: 2019-03-28 | Stop reason: HOSPADM

## 2019-03-28 NOTE — BRIEF OP NOTE
FER Medial Branch Block B/L L3,4,5 (B) Procedure Note    Procedure:    FER Medial Branch Block B/L L3,4,5  CPT(R) Code:  72569 - [place] INJ DX/THER AGNT PARAVERT FACET JOINT,IMG GUIDE,LUMBAR/SAC    Pre-op Diagnosis     * Degenerative lumbar disc [M51.36]       Post-op Diagnosis     * Degenerative lumbar disc [M51.36]    Surgeon(s) and Role:     * Aurea Busby MD - Primary    Anesthesia: Local    Staff:   Circulator: Michael Champagne RN  Scrub Person: Eduardo Nogueira RN    Procedure Details   Multilevel lumbar medial branch block under fluoroscopic guidance, bilateral [id number]    Here in the surgicenter for medial branch blocks. Pretreatment room evaluation completed. No change in symptoms. No anticoagulants or antiplatelet agents taken today. 10 system review completed stable complaint set as documented. No anticoagulants or antiplatelet agents taken today. 10 system review completed stable complaint set as documented.     1. Fluoroscopic localization  2. Lumbar Medial branch block  3. Levels and Laterality Completed bilateral [id number]-G1 with fluoroscopy     After informed consent was obtained, the patient was brought to the Fluoroscopy Suite.  The target point was identified using AP fluoroscopy in the lumbar region demonstrating the intersection of the superior articulating process, pedicle, and transverse processes at each indicated level or the target point in the groove formed by sacral ala and superior articulating process if at L5S1.   A skin wheal was placed with a #27 gauge, 1¼ inch needle using 2% Nesacaine at each treatment level. Then a 3½ inch, #22 gauge spinal needle with imposed curvature was advanced using intermittent biplanar fluoroscopy into the target points at each treatment level using intermittent lateral fluoroscopy to confirm depth.  Then Omnipaque 180 was delivered to observe for any evidence of vascular runoff.  After confirming no heme or CSF, then 1 cc mixture containing 40 mg of  Depo-Medrol and 0.25% preservative-free bupivacaine was instilled and a similar manner at each bilateral [id number] levels indicated.  The 6 needles were re-styletted and withdrawn.      Estimated Blood Loss: No blood loss documented.    Specimens:                No specimens collected during this procedure.      Drains:      Implants: * No implants in log *           Complications:  None; patient tolerated the procedure well.           Disposition: PACU - hemodynamically stable.           Condition: stable    Aurea Busby MD  Phone Number: [phone]
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
## 2019-06-24 ENCOUNTER — TRANSCRIBE ORDERS (OUTPATIENT)
Dept: LAB | Facility: CLINIC | Age: 69
End: 2019-06-24

## 2019-06-24 ENCOUNTER — APPOINTMENT (OUTPATIENT)
Dept: LAB | Facility: CLINIC | Age: 69
End: 2019-06-24
Attending: INTERNAL MEDICINE
Payer: MEDICARE

## 2019-06-24 DIAGNOSIS — E55.9 VITAMIN D DEFICIENCY: ICD-10-CM

## 2019-06-24 DIAGNOSIS — E03.4 ACQUIRED ATROPHY OF THYROID: ICD-10-CM

## 2019-06-24 DIAGNOSIS — D64.9 ANEMIA: ICD-10-CM

## 2019-06-24 DIAGNOSIS — I10 ESSENTIAL (PRIMARY) HYPERTENSION: ICD-10-CM

## 2019-06-24 DIAGNOSIS — E78.5 HYPERLIPIDEMIA: Primary | ICD-10-CM

## 2019-06-24 DIAGNOSIS — E78.5 HYPERLIPIDEMIA: ICD-10-CM

## 2019-06-24 LAB
25(OH)D3 SERPL-MCNC: 40 NG/ML (ref 30–100)
ALBUMIN SERPL-MCNC: 4.4 G/DL (ref 3.4–5)
ALP SERPL-CCNC: 51 IU/L (ref 35–126)
ALT SERPL-CCNC: 17 IU/L (ref 11–54)
ANION GAP SERPL CALC-SCNC: 9 MEQ/L (ref 3–15)
AST SERPL-CCNC: 19 IU/L (ref 15–41)
BASOPHILS # BLD: 0.07 K/UL (ref 0.01–0.1)
BASOPHILS NFR BLD: 1.5 %
BILIRUB SERPL-MCNC: 0.6 MG/DL (ref 0.3–1.2)
BUN SERPL-MCNC: 12 MG/DL (ref 8–20)
CALCIUM SERPL-MCNC: 9.8 MG/DL (ref 8.9–10.3)
CHLORIDE SERPL-SCNC: 105 MEQ/L (ref 98–109)
CHOLEST SERPL-MCNC: 175 MG/DL
CO2 SERPL-SCNC: 29 MEQ/L (ref 22–32)
CREAT SERPL-MCNC: 0.9 MG/DL
DIFFERENTIAL METHOD BLD: NORMAL
EOSINOPHIL # BLD: 0.11 K/UL (ref 0.04–0.36)
EOSINOPHIL NFR BLD: 2.4 %
ERYTHROCYTE [DISTWIDTH] IN BLOOD BY AUTOMATED COUNT: 12.3 % (ref 11.7–14.4)
GFR SERPL CREATININE-BSD FRML MDRD: >60 ML/MIN/1.73M*2
GLUCOSE SERPL-MCNC: 72 MG/DL (ref 70–99)
HCT VFR BLDCO AUTO: 38 %
HDLC SERPL-MCNC: 72 MG/DL
HDLC SERPL: 2.4 {RATIO}
HGB BLD-MCNC: 12.4 G/DL
IMM GRANULOCYTES # BLD AUTO: 0.02 K/UL (ref 0–0.08)
IMM GRANULOCYTES NFR BLD AUTO: 0.4 %
LDLC SERPL CALC-MCNC: 87 MG/DL
LYMPHOCYTES # BLD: 1.96 K/UL (ref 1.2–3.5)
LYMPHOCYTES NFR BLD: 42.8 %
MCH RBC QN AUTO: 33.2 PG (ref 28–33.2)
MCHC RBC AUTO-ENTMCNC: 32.6 G/DL (ref 32.2–35.5)
MCV RBC AUTO: 101.9 FL (ref 83–98)
MONOCYTES # BLD: 0.59 K/UL (ref 0.28–0.8)
MONOCYTES NFR BLD: 12.9 %
NEUTROPHILS # BLD: 1.83 K/UL (ref 1.7–7)
NEUTS SEG NFR BLD: 40 %
NONHDLC SERPL-MCNC: 103 MG/DL
NRBC BLD-RTO: 0 %
PDW BLD AUTO: 10.5 FL (ref 9.4–12.3)
PLATELET # BLD AUTO: 280 K/UL
POTASSIUM SERPL-SCNC: 4.2 MEQ/L (ref 3.6–5.1)
PROT SERPL-MCNC: 6.6 G/DL (ref 6–8.2)
RBC # BLD AUTO: 3.73 M/UL (ref 3.93–5.22)
SODIUM SERPL-SCNC: 143 MEQ/L (ref 136–144)
TRIGL SERPL-MCNC: 80 MG/DL (ref 30–149)
TSH SERPL DL<=0.05 MIU/L-ACNC: 3.75 MIU/L (ref 0.34–5.6)
WBC # BLD AUTO: 4.58 K/UL

## 2019-06-24 PROCEDURE — 80061 LIPID PANEL: CPT

## 2019-06-24 PROCEDURE — 80053 COMPREHEN METABOLIC PANEL: CPT

## 2019-06-24 PROCEDURE — 36415 COLL VENOUS BLD VENIPUNCTURE: CPT

## 2019-06-24 PROCEDURE — 84443 ASSAY THYROID STIM HORMONE: CPT

## 2019-06-24 PROCEDURE — 85025 COMPLETE CBC W/AUTO DIFF WBC: CPT

## 2019-06-24 PROCEDURE — 82306 VITAMIN D 25 HYDROXY: CPT

## 2019-07-10 ENCOUNTER — TRANSCRIBE ORDERS (OUTPATIENT)
Dept: SCHEDULING | Age: 69
End: 2019-07-10

## 2019-07-10 DIAGNOSIS — H93.13 TINNITUS OF BOTH EARS: Primary | ICD-10-CM

## 2019-07-11 ENCOUNTER — HOSPITAL ENCOUNTER (OUTPATIENT)
Dept: RADIOLOGY | Facility: CLINIC | Age: 69
Discharge: HOME | End: 2019-07-11
Attending: INTERNAL MEDICINE
Payer: MEDICARE

## 2019-07-11 DIAGNOSIS — H93.13 TINNITUS OF BOTH EARS: ICD-10-CM

## 2019-07-11 RX ORDER — GADOBUTROL 604.72 MG/ML
0.1 INJECTION INTRAVENOUS ONCE
Status: COMPLETED | OUTPATIENT
Start: 2019-07-11 | End: 2019-07-11

## 2019-07-11 RX ADMIN — GADOBUTROL 6.6 MMOL: 604.72 INJECTION INTRAVENOUS at 12:47

## 2019-10-14 ENCOUNTER — TRANSCRIBE ORDERS (OUTPATIENT)
Dept: SCHEDULING | Age: 69
End: 2019-10-14

## 2019-10-14 DIAGNOSIS — M48.061 SPINAL STENOSIS, LUMBAR REGION WITHOUT NEUROGENIC CLAUDICATION: Primary | ICD-10-CM

## 2019-10-14 DIAGNOSIS — M54.16 RADICULOPATHY, LUMBAR REGION: ICD-10-CM

## 2019-10-17 ENCOUNTER — HOSPITAL ENCOUNTER (OUTPATIENT)
Dept: RADIOLOGY | Facility: CLINIC | Age: 69
Discharge: HOME | End: 2019-10-17
Attending: PSYCHIATRY & NEUROLOGY
Payer: MEDICARE

## 2019-10-17 DIAGNOSIS — M48.061 SPINAL STENOSIS, LUMBAR REGION WITHOUT NEUROGENIC CLAUDICATION: ICD-10-CM

## 2019-10-17 DIAGNOSIS — M54.16 RADICULOPATHY, LUMBAR REGION: ICD-10-CM

## 2020-03-09 ENCOUNTER — TRANSCRIBE ORDERS (OUTPATIENT)
Dept: SCHEDULING | Age: 70
End: 2020-03-09

## 2020-03-16 ENCOUNTER — TRANSCRIBE ORDERS (OUTPATIENT)
Dept: RADIOLOGY | Facility: CLINIC | Age: 70
End: 2020-03-16

## 2020-03-16 ENCOUNTER — HOSPITAL ENCOUNTER (OUTPATIENT)
Dept: RADIOLOGY | Facility: CLINIC | Age: 70
Discharge: HOME | End: 2020-03-16
Attending: INTERNAL MEDICINE
Payer: MEDICARE

## 2020-03-16 DIAGNOSIS — Z12.31 ENCOUNTER FOR SCREENING MAMMOGRAM FOR MALIGNANT NEOPLASM OF BREAST: ICD-10-CM

## 2020-03-16 DIAGNOSIS — Z12.31 ENCOUNTER FOR SCREENING MAMMOGRAM FOR MALIGNANT NEOPLASM OF BREAST: Primary | ICD-10-CM

## 2020-03-16 PROCEDURE — 77063 BREAST TOMOSYNTHESIS BI: CPT

## 2021-02-05 ENCOUNTER — APPOINTMENT (OUTPATIENT)
Dept: RADIOLOGY | Age: 71
End: 2021-02-05
Attending: FAMILY MEDICINE
Payer: MEDICARE

## 2021-02-05 ENCOUNTER — HOSPITAL ENCOUNTER (OUTPATIENT)
Facility: CLINIC | Age: 71
Discharge: HOME | End: 2021-02-05
Attending: FAMILY MEDICINE
Payer: MEDICARE

## 2021-02-05 VITALS
BODY MASS INDEX: 20.47 KG/M2 | DIASTOLIC BLOOD PRESSURE: 78 MMHG | OXYGEN SATURATION: 98 % | RESPIRATION RATE: 14 BRPM | SYSTOLIC BLOOD PRESSURE: 120 MMHG | WEIGHT: 143 LBS | HEART RATE: 77 BPM | HEIGHT: 70 IN | TEMPERATURE: 98.4 F

## 2021-02-05 DIAGNOSIS — S92.352A CLOSED DISPLACED FRACTURE OF FIFTH METATARSAL BONE OF LEFT FOOT, INITIAL ENCOUNTER: Primary | ICD-10-CM

## 2021-02-05 PROCEDURE — 99202 OFFICE O/P NEW SF 15 MIN: CPT | Performed by: FAMILY MEDICINE

## 2021-02-05 PROCEDURE — 73630 X-RAY EXAM OF FOOT: CPT | Mod: LT | Performed by: FAMILY MEDICINE

## 2021-02-05 ASSESSMENT — ENCOUNTER SYMPTOMS
VOMITING: 0
DIZZINESS: 0
LIGHT-HEADEDNESS: 0
NAUSEA: 0
ARTHRALGIAS: 0
COLOR CHANGE: 1
FEVER: 0
CHILLS: 0
JOINT SWELLING: 0
NUMBNESS: 0

## 2021-02-05 NOTE — ED PROVIDER NOTES
History  Chief Complaint   Patient presents with   • Fall     injuried left foot      Misstepped and sustained a left foot injury 1 week ago. No improvement since.       History provided by:  Patient   used: No    Trauma  Mechanism of injury: fall  Injury location: foot  Injury location detail: L foot  Incident location: home  Time since incident: 7 days  Arrived directly from scene: no     Fall:       Fall occurred: walking       Impact surface: unknown       Point of impact: feet       Entrapped after fall: no       Suspicion of alcohol use: no       Suspicion of drug use: no    Current symptoms:       Associated symptoms:             Denies nausea and vomiting.       Past Medical History:   Diagnosis Date   • Arthritis    • Hyperlipidemia        Past Surgical History:   Procedure Laterality Date   • APPENDECTOMY     • TOTAL HIP ARTHROPLASTY         No family history on file.    Social History     Tobacco Use   • Smoking status: Never Smoker   • Smokeless tobacco: Never Used   Substance Use Topics   • Alcohol use: Not on file   • Drug use: Not on file       Review of Systems   Constitutional: Negative for chills and fever.   Gastrointestinal: Negative for nausea and vomiting.   Musculoskeletal: Negative for arthralgias and joint swelling.        L lateral foot pain   Skin: Positive for color change (ecchymosis).   Neurological: Negative for dizziness, light-headedness and numbness.       Physical Exam  ED Triage Vitals [02/05/21 1136]   Temp Heart Rate Resp BP SpO2   36.9 °C (98.4 °F) 77 14 120/78 98 %      Temp Source Heart Rate Source Patient Position BP Location FiO2 (%) (Set)   Oral -- Sitting Left upper arm --       Physical Exam  Constitutional:       General: She is in acute distress.      Appearance: Normal appearance. She is not ill-appearing.   Eyes:      Extraocular Movements: Extraocular movements intact.   Neck:      Musculoskeletal: Normal range of motion.   Musculoskeletal:          General: Tenderness and signs of injury present.        Feet:    Skin:     Findings: Bruising (left lateral foot) present.   Neurological:      Mental Status: She is alert.           Procedures  Procedures    UC Course  Clinical Impressions as of Feb 05 1304   Closed displaced fracture of fifth metatarsal bone of left foot, initial encounter       MDM  Number of Diagnoses or Management Options  Diagnosis management comments: Misstepped and sustained a left foot injury 1 week ago. No improvement since.   Exam shows tenderness and ecchymosis over proximal L fifth metatarsal.   Xray shows There is a nondisplaced fracture through the proximal end of the fifth metatarsal.  Boot applied  Recommending RICE & NSAIDs  Recommending Podiatric Orthopedic specialist follow up.        Amount and/or Complexity of Data Reviewed  Tests in the radiology section of CPT®: ordered and reviewed    Risk of Complications, Morbidity, and/or Mortality  Presenting problems: low  Diagnostic procedures: low  Management options: low    Critical Care  Total time providing critical care: < 30 minutes    Patient Progress  Patient progress: stable                 John Willett DO  02/05/21 1319

## 2021-02-05 NOTE — DISCHARGE INSTRUCTIONS
Ice painful areas, 20mins every hour  Elevate injured body part(s) above the level of the heart.     Recommending submersion in an ice bucket, 20min in, 40min out for additional relief.     Remember,  Ice is Nice, that's why they rhyme, and you can use it ALL the time : )       For pain:    You may alternate Ibuprofen with Tylenol every 3-4 hours. (Ex: Ibuprofen at 8am, Tylenol at 11am, Ibuprofen at 2pm, etc) as needed.   Ibuprofen 600mg every 6hr, Always with food     Tylenol 500mg-650mg doses every 6hrs     Recommending follow up with a Podiatric Orthopedic specialist.      Please let us know about your experience today!   Your input is truly appreciated and helps Dr. Willett and your team at [facility] to continue to provide a superior level of service!   Get Well Soon!

## 2021-04-13 DIAGNOSIS — Z23 ENCOUNTER FOR IMMUNIZATION: ICD-10-CM

## 2021-05-17 ENCOUNTER — TRANSCRIBE ORDERS (OUTPATIENT)
Dept: SCHEDULING | Age: 71
End: 2021-05-17

## 2021-05-17 DIAGNOSIS — Z12.31 ENCOUNTER FOR SCREENING MAMMOGRAM FOR MALIGNANT NEOPLASM OF BREAST: Primary | ICD-10-CM

## 2021-05-24 ENCOUNTER — HOSPITAL ENCOUNTER (OUTPATIENT)
Dept: RADIOLOGY | Facility: CLINIC | Age: 71
Discharge: HOME | End: 2021-05-24
Attending: INTERNAL MEDICINE
Payer: MEDICARE

## 2021-05-24 DIAGNOSIS — Z12.31 ENCOUNTER FOR SCREENING MAMMOGRAM FOR MALIGNANT NEOPLASM OF BREAST: ICD-10-CM

## 2021-05-24 PROCEDURE — 77063 BREAST TOMOSYNTHESIS BI: CPT

## 2021-07-29 ENCOUNTER — TRANSCRIBE ORDERS (OUTPATIENT)
Dept: LAB | Age: 71
End: 2021-07-29

## 2021-07-29 ENCOUNTER — APPOINTMENT (OUTPATIENT)
Dept: LAB | Age: 71
End: 2021-07-29
Attending: INTERNAL MEDICINE
Payer: MEDICARE

## 2021-07-29 DIAGNOSIS — Z01.812 ENCOUNTER FOR PREPROCEDURAL LABORATORY EXAMINATION: ICD-10-CM

## 2021-07-29 DIAGNOSIS — Z01.818 ENCOUNTER FOR OTHER PREPROCEDURAL EXAMINATION: ICD-10-CM

## 2021-07-29 DIAGNOSIS — Z01.812 ENCOUNTER FOR PREPROCEDURAL LABORATORY EXAMINATION: Primary | ICD-10-CM

## 2021-07-29 LAB
ANION GAP SERPL CALC-SCNC: 12 MEQ/L (ref 3–15)
BASOPHILS # BLD: 0.05 K/UL (ref 0.01–0.1)
BASOPHILS NFR BLD: 1 %
BUN SERPL-MCNC: 20 MG/DL (ref 8–20)
CALCIUM SERPL-MCNC: 10.1 MG/DL (ref 8.9–10.3)
CHLORIDE SERPL-SCNC: 102 MEQ/L (ref 98–109)
CO2 SERPL-SCNC: 26 MEQ/L (ref 22–32)
CREAT SERPL-MCNC: 0.8 MG/DL (ref 0.6–1.1)
DIFFERENTIAL METHOD BLD: ABNORMAL
EOSINOPHIL # BLD: 0.16 K/UL (ref 0.04–0.36)
EOSINOPHIL NFR BLD: 3.1 %
ERYTHROCYTE [DISTWIDTH] IN BLOOD BY AUTOMATED COUNT: 12.6 % (ref 11.7–14.4)
GFR SERPL CREATININE-BSD FRML MDRD: >60 ML/MIN/1.73M*2
GLUCOSE SERPL-MCNC: 82 MG/DL (ref 70–99)
HCT VFR BLDCO AUTO: 38.3 % (ref 35–45)
HGB BLD-MCNC: 12.4 G/DL (ref 11.8–15.7)
IMM GRANULOCYTES # BLD AUTO: 0.01 K/UL (ref 0–0.08)
IMM GRANULOCYTES NFR BLD AUTO: 0.2 %
LYMPHOCYTES # BLD: 2.15 K/UL (ref 1.2–3.5)
LYMPHOCYTES NFR BLD: 41.7 %
MCH RBC QN AUTO: 32.4 PG (ref 28–33.2)
MCHC RBC AUTO-ENTMCNC: 32.4 G/DL (ref 32.2–35.5)
MCV RBC AUTO: 100 FL (ref 83–98)
MONOCYTES # BLD: 0.51 K/UL (ref 0.28–0.8)
MONOCYTES NFR BLD: 9.9 %
NEUTROPHILS # BLD: 2.28 K/UL (ref 1.7–7)
NEUTS SEG NFR BLD: 44.1 %
NRBC BLD-RTO: 0 %
PDW BLD AUTO: 10.3 FL (ref 9.4–12.3)
PLATELET # BLD AUTO: 280 K/UL (ref 150–369)
POTASSIUM SERPL-SCNC: 4.3 MEQ/L (ref 3.6–5.1)
RBC # BLD AUTO: 3.83 M/UL (ref 3.93–5.22)
SODIUM SERPL-SCNC: 140 MEQ/L (ref 136–144)
WBC # BLD AUTO: 5.16 K/UL (ref 3.8–10.5)

## 2021-07-29 PROCEDURE — 80048 BASIC METABOLIC PNL TOTAL CA: CPT

## 2021-07-29 PROCEDURE — 36415 COLL VENOUS BLD VENIPUNCTURE: CPT

## 2021-07-29 PROCEDURE — 85025 COMPLETE CBC W/AUTO DIFF WBC: CPT

## 2021-08-05 PROCEDURE — 88304 TISSUE EXAM BY PATHOLOGIST: CPT | Performed by: ORTHOPAEDIC SURGERY

## 2021-08-06 ENCOUNTER — LAB REQUISITION (OUTPATIENT)
Dept: LAB | Facility: HOSPITAL | Age: 71
End: 2021-08-06
Attending: ORTHOPAEDIC SURGERY
Payer: MEDICARE

## 2021-08-06 DIAGNOSIS — R22.32 LOCALIZED SWELLING, MASS AND LUMP, LEFT UPPER LIMB: ICD-10-CM

## 2021-08-10 LAB
CASE RPRT: NORMAL
CLINICAL INFO: NORMAL
PATH REPORT.FINAL DX SPEC: NORMAL
PATH REPORT.GROSS SPEC: NORMAL

## 2021-12-27 ENCOUNTER — TRANSCRIBE ORDERS (OUTPATIENT)
Dept: LAB | Age: 71
End: 2021-12-27

## 2021-12-27 ENCOUNTER — APPOINTMENT (OUTPATIENT)
Dept: LAB | Age: 71
End: 2021-12-27
Attending: INTERNAL MEDICINE
Payer: MEDICARE

## 2021-12-27 DIAGNOSIS — R94.5 ABNORMAL RESULTS OF LIVER FUNCTION STUDIES: ICD-10-CM

## 2021-12-27 DIAGNOSIS — I10 ESSENTIAL (PRIMARY) HYPERTENSION: ICD-10-CM

## 2021-12-27 DIAGNOSIS — D53.9 NUTRITIONAL ANEMIA, UNSPECIFIED: ICD-10-CM

## 2021-12-27 DIAGNOSIS — E78.00 PURE HYPERCHOLESTEROLEMIA: ICD-10-CM

## 2021-12-27 DIAGNOSIS — E03.9 HYPOTHYROIDISM, UNSPECIFIED: ICD-10-CM

## 2021-12-27 DIAGNOSIS — E78.00 PURE HYPERCHOLESTEROLEMIA: Primary | ICD-10-CM

## 2021-12-27 LAB
ALBUMIN SERPL-MCNC: 4.2 G/DL (ref 3.4–5)
ALP SERPL-CCNC: 45 IU/L (ref 35–126)
ALT SERPL-CCNC: 18 IU/L (ref 11–54)
ANION GAP SERPL CALC-SCNC: 14 MEQ/L (ref 3–15)
AST SERPL-CCNC: 21 IU/L (ref 15–41)
BASOPHILS # BLD: 0.05 K/UL (ref 0.01–0.1)
BASOPHILS NFR BLD: 1.2 %
BILIRUB DIRECT SERPL-MCNC: 0.1 MG/DL
BILIRUB SERPL-MCNC: 0.6 MG/DL (ref 0.3–1.2)
BUN SERPL-MCNC: 16 MG/DL (ref 8–20)
CALCIUM SERPL-MCNC: 10 MG/DL (ref 8.9–10.3)
CHLORIDE SERPL-SCNC: 106 MEQ/L (ref 98–109)
CHOLEST SERPL-MCNC: 196 MG/DL
CO2 SERPL-SCNC: 25 MEQ/L (ref 22–32)
CREAT SERPL-MCNC: 0.9 MG/DL (ref 0.6–1.1)
DIFFERENTIAL METHOD BLD: ABNORMAL
EOSINOPHIL # BLD: 0.09 K/UL (ref 0.04–0.36)
EOSINOPHIL NFR BLD: 2.2 %
ERYTHROCYTE [DISTWIDTH] IN BLOOD BY AUTOMATED COUNT: 12.7 % (ref 11.7–14.4)
ERYTHROCYTE [SEDIMENTATION RATE] IN BLOOD BY WESTERGREN METHOD: <3 MM/HR
GFR SERPL CREATININE-BSD FRML MDRD: >60 ML/MIN/1.73M*2
GLUCOSE SERPL-MCNC: 89 MG/DL (ref 70–99)
HCT VFR BLDCO AUTO: 40.4 % (ref 35–45)
HDLC SERPL-MCNC: 72 MG/DL
HDLC SERPL: 2.7 {RATIO}
HGB BLD-MCNC: 12.8 G/DL (ref 11.8–15.7)
IMM GRANULOCYTES # BLD AUTO: 0.01 K/UL (ref 0–0.08)
IMM GRANULOCYTES NFR BLD AUTO: 0.2 %
LDH SERPL L TO P-CCNC: 111 IU/L (ref 98–192)
LDLC SERPL CALC-MCNC: 95 MG/DL
LYMPHOCYTES # BLD: 2.08 K/UL (ref 1.2–3.5)
LYMPHOCYTES NFR BLD: 51.2 %
MAGNESIUM SERPL-MCNC: 2.1 MG/DL (ref 1.8–2.5)
MCH RBC QN AUTO: 32.7 PG (ref 28–33.2)
MCHC RBC AUTO-ENTMCNC: 31.7 G/DL (ref 32.2–35.5)
MCV RBC AUTO: 103.1 FL (ref 83–98)
MONOCYTES # BLD: 0.43 K/UL (ref 0.28–0.8)
MONOCYTES NFR BLD: 10.6 %
NEUTROPHILS # BLD: 1.4 K/UL (ref 1.7–7)
NEUTS SEG NFR BLD: 34.6 %
NONHDLC SERPL-MCNC: 124 MG/DL
NRBC BLD-RTO: 0 %
PDW BLD AUTO: 10.1 FL (ref 9.4–12.3)
PHOSPHATE SERPL-MCNC: 3.7 MG/DL (ref 2.4–4.7)
PLATELET # BLD AUTO: 266 K/UL (ref 150–369)
POTASSIUM SERPL-SCNC: 4.4 MEQ/L (ref 3.6–5.1)
PROT SERPL-MCNC: 6.1 G/DL (ref 6–8.2)
RBC # BLD AUTO: 3.92 M/UL (ref 3.93–5.22)
SODIUM SERPL-SCNC: 145 MEQ/L (ref 136–144)
T4 FREE SERPL-MCNC: 0.66 NG/DL (ref 0.58–1.64)
TRIGL SERPL-MCNC: 143 MG/DL (ref 30–149)
URATE SERPL-MCNC: 3.5 MG/DL (ref 2.6–8)
WBC # BLD AUTO: 4.06 K/UL (ref 3.8–10.5)

## 2021-12-27 PROCEDURE — 85652 RBC SED RATE AUTOMATED: CPT

## 2021-12-27 PROCEDURE — 36415 COLL VENOUS BLD VENIPUNCTURE: CPT

## 2021-12-27 PROCEDURE — 84100 ASSAY OF PHOSPHORUS: CPT

## 2021-12-27 PROCEDURE — 83615 LACTATE (LD) (LDH) ENZYME: CPT

## 2021-12-27 PROCEDURE — 84439 ASSAY OF FREE THYROXINE: CPT

## 2021-12-27 PROCEDURE — 83735 ASSAY OF MAGNESIUM: CPT

## 2021-12-27 PROCEDURE — 84550 ASSAY OF BLOOD/URIC ACID: CPT

## 2021-12-27 PROCEDURE — 80061 LIPID PANEL: CPT

## 2021-12-27 PROCEDURE — 85025 COMPLETE CBC W/AUTO DIFF WBC: CPT

## 2021-12-27 PROCEDURE — 82248 BILIRUBIN DIRECT: CPT

## 2022-01-24 ENCOUNTER — APPOINTMENT (OUTPATIENT)
Dept: LAB | Age: 72
End: 2022-01-24
Attending: INTERNAL MEDICINE
Payer: MEDICARE

## 2022-01-24 ENCOUNTER — TRANSCRIBE ORDERS (OUTPATIENT)
Dept: LAB | Age: 72
End: 2022-01-24

## 2022-01-24 DIAGNOSIS — M25.551 PAIN IN RIGHT HIP: Primary | ICD-10-CM

## 2022-01-24 DIAGNOSIS — M25.551 PAIN IN RIGHT HIP: ICD-10-CM

## 2022-01-24 LAB
ALBUMIN SERPL-MCNC: 4.3 G/DL (ref 3.4–5)
ALP SERPL-CCNC: 46 IU/L (ref 35–126)
ALT SERPL-CCNC: 19 IU/L (ref 11–54)
ANION GAP SERPL CALC-SCNC: 11 MEQ/L (ref 3–15)
AST SERPL-CCNC: 21 IU/L (ref 15–41)
BILIRUB SERPL-MCNC: 0.6 MG/DL (ref 0.3–1.2)
BUN SERPL-MCNC: 20 MG/DL (ref 8–20)
CALCIUM SERPL-MCNC: 10.1 MG/DL (ref 8.9–10.3)
CHLORIDE SERPL-SCNC: 103 MEQ/L (ref 98–109)
CO2 SERPL-SCNC: 27 MEQ/L (ref 22–32)
CREAT SERPL-MCNC: 0.9 MG/DL (ref 0.6–1.1)
ERYTHROCYTE [DISTWIDTH] IN BLOOD BY AUTOMATED COUNT: 12.1 % (ref 11.7–14.4)
GFR SERPL CREATININE-BSD FRML MDRD: >60 ML/MIN/1.73M*2
GLUCOSE SERPL-MCNC: 73 MG/DL (ref 70–99)
HCT VFR BLDCO AUTO: 39.8 % (ref 35–45)
HGB BLD-MCNC: 12.9 G/DL (ref 11.8–15.7)
MCH RBC QN AUTO: 32.8 PG (ref 28–33.2)
MCHC RBC AUTO-ENTMCNC: 32.4 G/DL (ref 32.2–35.5)
MCV RBC AUTO: 101.3 FL (ref 83–98)
PDW BLD AUTO: 10.1 FL (ref 9.4–12.3)
PLATELET # BLD AUTO: 300 K/UL (ref 150–369)
POTASSIUM SERPL-SCNC: 4.9 MEQ/L (ref 3.6–5.1)
PROT SERPL-MCNC: 6.5 G/DL (ref 6–8.2)
RBC # BLD AUTO: 3.93 M/UL (ref 3.93–5.22)
SODIUM SERPL-SCNC: 141 MEQ/L (ref 136–144)
WBC # BLD AUTO: 4.53 K/UL (ref 3.8–10.5)

## 2022-01-24 PROCEDURE — 85027 COMPLETE CBC AUTOMATED: CPT

## 2022-01-24 PROCEDURE — 36415 COLL VENOUS BLD VENIPUNCTURE: CPT

## 2022-01-24 PROCEDURE — 80053 COMPREHEN METABOLIC PANEL: CPT

## 2022-04-05 ENCOUNTER — HOSPITAL ENCOUNTER (OUTPATIENT)
Facility: HOSPITAL | Age: 72
End: 2022-04-05
Attending: PSYCHIATRY & NEUROLOGY | Admitting: PSYCHIATRY & NEUROLOGY
Payer: MEDICARE

## 2022-05-16 ENCOUNTER — TRANSCRIBE ORDERS (OUTPATIENT)
Dept: SCHEDULING | Age: 72
End: 2022-05-16

## 2022-05-16 DIAGNOSIS — Z12.31 ENCOUNTER FOR SCREENING MAMMOGRAM FOR MALIGNANT NEOPLASM OF BREAST: Primary | ICD-10-CM

## 2022-05-27 ENCOUNTER — HOSPITAL ENCOUNTER (OUTPATIENT)
Dept: RADIOLOGY | Facility: CLINIC | Age: 72
Discharge: HOME | End: 2022-05-27
Attending: INTERNAL MEDICINE
Payer: MEDICARE

## 2022-05-27 DIAGNOSIS — Z12.31 ENCOUNTER FOR SCREENING MAMMOGRAM FOR MALIGNANT NEOPLASM OF BREAST: ICD-10-CM

## 2022-05-27 PROCEDURE — 77067 SCR MAMMO BI INCL CAD: CPT

## 2022-05-27 PROCEDURE — 77063 BREAST TOMOSYNTHESIS BI: CPT

## 2022-06-07 ENCOUNTER — TRANSCRIBE ORDERS (OUTPATIENT)
Dept: LAB | Age: 72
End: 2022-06-07

## 2022-06-07 ENCOUNTER — APPOINTMENT (OUTPATIENT)
Dept: LAB | Age: 72
End: 2022-06-07
Attending: INTERNAL MEDICINE
Payer: MEDICARE

## 2022-06-07 DIAGNOSIS — R70.0 ELEVATED ERYTHROCYTE SEDIMENTATION RATE: ICD-10-CM

## 2022-06-07 DIAGNOSIS — E03.9 HYPOTHYROIDISM, UNSPECIFIED: ICD-10-CM

## 2022-06-07 DIAGNOSIS — D53.9 NUTRITIONAL ANEMIA, UNSPECIFIED: ICD-10-CM

## 2022-06-07 DIAGNOSIS — I10 ESSENTIAL (PRIMARY) HYPERTENSION: ICD-10-CM

## 2022-06-07 DIAGNOSIS — E78.5 HYPERLIPIDEMIA, UNSPECIFIED: ICD-10-CM

## 2022-06-07 DIAGNOSIS — E78.5 HYPERLIPIDEMIA, UNSPECIFIED: Primary | ICD-10-CM

## 2022-06-07 LAB
ALBUMIN SERPL-MCNC: 4.2 G/DL (ref 3.4–5)
ALP SERPL-CCNC: 46 IU/L (ref 35–126)
ALT SERPL-CCNC: 19 IU/L (ref 11–54)
ANION GAP SERPL CALC-SCNC: 10 MEQ/L (ref 3–15)
AST SERPL-CCNC: 21 IU/L (ref 15–41)
BASOPHILS # BLD: 0.06 K/UL (ref 0.01–0.1)
BASOPHILS NFR BLD: 1.6 %
BILIRUB SERPL-MCNC: 0.6 MG/DL (ref 0.3–1.2)
BUN SERPL-MCNC: 17 MG/DL (ref 8–20)
BURR CELLS BLD QL SMEAR: ABNORMAL
CALCIUM SERPL-MCNC: 9.7 MG/DL (ref 8.9–10.3)
CHLORIDE SERPL-SCNC: 106 MEQ/L (ref 98–109)
CHOLEST SERPL-MCNC: 182 MG/DL
CO2 SERPL-SCNC: 27 MEQ/L (ref 22–32)
CREAT SERPL-MCNC: 0.9 MG/DL (ref 0.6–1.1)
DIFFERENTIAL METHOD BLD: ABNORMAL
EOSINOPHIL # BLD: 0.06 K/UL (ref 0.04–0.36)
EOSINOPHIL NFR BLD: 1.6 %
ERYTHROCYTE [DISTWIDTH] IN BLOOD BY AUTOMATED COUNT: 12.8 % (ref 11.7–14.4)
ERYTHROCYTE [SEDIMENTATION RATE] IN BLOOD BY WESTERGREN METHOD: <3 MM/HR
GFR SERPL CREATININE-BSD FRML MDRD: >60 ML/MIN/1.73M*2
GLUCOSE SERPL-MCNC: 80 MG/DL (ref 70–99)
HCT VFR BLDCO AUTO: 39.8 % (ref 35–45)
HDLC SERPL-MCNC: 61 MG/DL
HDLC SERPL: 3 {RATIO}
HGB BLD-MCNC: 12.8 G/DL (ref 11.8–15.7)
IMM GRANULOCYTES # BLD AUTO: 0.01 K/UL (ref 0–0.08)
IMM GRANULOCYTES NFR BLD AUTO: 0.3 %
LDH SERPL L TO P-CCNC: 116 IU/L (ref 98–192)
LDLC SERPL CALC-MCNC: 92 MG/DL
LYMPHOCYTES # BLD: 1.93 K/UL (ref 1.2–3.5)
LYMPHOCYTES NFR BLD: 49.9 %
MAGNESIUM SERPL-MCNC: 2 MG/DL (ref 1.8–2.5)
MCH RBC QN AUTO: 31.2 PG (ref 28–33.2)
MCHC RBC AUTO-ENTMCNC: 32.2 G/DL (ref 32.2–35.5)
MCV RBC AUTO: 97.1 FL (ref 83–98)
MONOCYTES # BLD: 0.42 K/UL (ref 0.28–0.8)
MONOCYTES NFR BLD: 10.9 %
NEUTROPHILS # BLD: 1.39 K/UL (ref 1.7–7)
NEUTS SEG NFR BLD: 35.7 %
NONHDLC SERPL-MCNC: 121 MG/DL
NRBC BLD-RTO: 0 %
PDW BLD AUTO: 9.9 FL (ref 9.4–12.3)
PHOSPHATE SERPL-MCNC: 4 MG/DL (ref 2.4–4.7)
PLAT MORPH BLD: NORMAL
PLATELET # BLD AUTO: 277 K/UL (ref 150–369)
PLATELET # BLD EST: ABNORMAL 10*3/UL
POTASSIUM SERPL-SCNC: 4.2 MEQ/L (ref 3.6–5.1)
PROT SERPL-MCNC: 6.4 G/DL (ref 6–8.2)
RBC # BLD AUTO: 4.1 M/UL (ref 3.93–5.22)
SODIUM SERPL-SCNC: 143 MEQ/L (ref 136–144)
T4 FREE SERPL-MCNC: 0.68 NG/DL (ref 0.58–1.64)
TRIGL SERPL-MCNC: 143 MG/DL (ref 30–149)
TSH SERPL DL<=0.05 MIU/L-ACNC: 4.14 MIU/L (ref 0.34–5.6)
URATE SERPL-MCNC: 3 MG/DL (ref 2.6–8)
WBC # BLD AUTO: 3.87 K/UL (ref 3.8–10.5)

## 2022-06-07 PROCEDURE — 84550 ASSAY OF BLOOD/URIC ACID: CPT

## 2022-06-07 PROCEDURE — 83615 LACTATE (LD) (LDH) ENZYME: CPT

## 2022-06-07 PROCEDURE — 84439 ASSAY OF FREE THYROXINE: CPT

## 2022-06-07 PROCEDURE — 84443 ASSAY THYROID STIM HORMONE: CPT

## 2022-06-07 PROCEDURE — 80053 COMPREHEN METABOLIC PANEL: CPT

## 2022-06-07 PROCEDURE — 83735 ASSAY OF MAGNESIUM: CPT

## 2022-06-07 PROCEDURE — 36415 COLL VENOUS BLD VENIPUNCTURE: CPT

## 2022-06-07 PROCEDURE — 85652 RBC SED RATE AUTOMATED: CPT

## 2022-06-07 PROCEDURE — 80061 LIPID PANEL: CPT

## 2022-06-07 PROCEDURE — 85025 COMPLETE CBC W/AUTO DIFF WBC: CPT

## 2022-06-07 PROCEDURE — 84100 ASSAY OF PHOSPHORUS: CPT

## 2022-06-23 ENCOUNTER — TRANSCRIBE ORDERS (OUTPATIENT)
Dept: SCHEDULING | Age: 72
End: 2022-06-23

## 2022-06-23 DIAGNOSIS — E55.9 VITAMIN D DEFICIENCY, UNSPECIFIED: Primary | ICD-10-CM

## 2022-08-04 ENCOUNTER — HOSPITAL ENCOUNTER (OUTPATIENT)
Dept: RADIOLOGY | Facility: CLINIC | Age: 72
Discharge: HOME | End: 2022-08-04
Attending: INTERNAL MEDICINE
Payer: MEDICARE

## 2022-08-04 DIAGNOSIS — E55.9 VITAMIN D DEFICIENCY, UNSPECIFIED: ICD-10-CM

## 2022-08-04 PROCEDURE — 77080 DXA BONE DENSITY AXIAL: CPT

## 2023-05-11 ENCOUNTER — HOSPITAL ENCOUNTER (OUTPATIENT)
Facility: CLINIC | Age: 73
Discharge: HOME | End: 2023-05-11
Attending: EMERGENCY MEDICINE
Payer: MEDICARE

## 2023-05-11 VITALS
TEMPERATURE: 97.9 F | RESPIRATION RATE: 18 BRPM | OXYGEN SATURATION: 98 % | SYSTOLIC BLOOD PRESSURE: 110 MMHG | HEART RATE: 76 BPM | DIASTOLIC BLOOD PRESSURE: 60 MMHG

## 2023-05-11 DIAGNOSIS — J02.9 ACUTE PHARYNGITIS, UNSPECIFIED ETIOLOGY: Primary | ICD-10-CM

## 2023-05-11 LAB
EXPIRATION DATE: NORMAL
Lab: NORMAL
POCT MANUFACTURER: NORMAL
S PYO AG THROAT QL: NEGATIVE

## 2023-05-11 PROCEDURE — 87081 CULTURE SCREEN ONLY: CPT | Performed by: NURSE PRACTITIONER

## 2023-05-11 PROCEDURE — 99213 OFFICE O/P EST LOW 20 MIN: CPT | Performed by: NURSE PRACTITIONER

## 2023-05-11 PROCEDURE — 87880 STREP A ASSAY W/OPTIC: CPT | Mod: QW | Performed by: NURSE PRACTITIONER

## 2023-05-11 RX ORDER — ATORVASTATIN CALCIUM 10 MG/1
TABLET, FILM COATED ORAL
COMMUNITY
Start: 2023-03-14

## 2023-05-11 RX ORDER — CLONAZEPAM 1 MG/1
TABLET ORAL
COMMUNITY
Start: 2023-03-29

## 2023-05-11 RX ORDER — MIDODRINE HYDROCHLORIDE 5 MG/1
TABLET ORAL
COMMUNITY
Start: 2023-03-13

## 2023-05-11 RX ORDER — ALENDRONATE SODIUM 70 MG/1
TABLET ORAL
COMMUNITY
Start: 2023-04-11

## 2023-05-11 ASSESSMENT — ENCOUNTER SYMPTOMS
SORE THROAT: 1
RHINORRHEA: 1
RESPIRATORY NEGATIVE: 1
FEVER: 0
CHILLS: 0

## 2023-05-11 NOTE — DISCHARGE INSTRUCTIONS
Rapid strep is negative.  We will send off for culture.  Recommend supportive care/symptom treatment.  Alternate Tylenol Motrin for your pain.  Salt water gargles will help as well.  Well-hydrated.  Recommend Flonase daily and saline sprays twice daily. Could consider antihistamine like Claritin. Follow-up with your PCP if not improving.

## 2023-05-11 NOTE — ED PROVIDER NOTES
Emergency Medicine Note  HPI   HISTORY OF PRESENT ILLNESS     Patient is a 73-year-old female who presents with concerns for sore throat runny nose and some sneezing.  She has felt warm but has not had a fever.  She says all of this started 2 days ago.  Her sore throat was worse yesterday she states.  She has a clear runny nose.  She was concerned because her neighbor says she was exposed to strep and then was also positive.  She was around her the day after she was tested positive but denies sharing any cups with this neighbor.            Patient History   PAST HISTORY     Reviewed from Nursing Triage:       Past Medical History:   Diagnosis Date   • Arthritis    • Hyperlipidemia        Past Surgical History:   Procedure Laterality Date   • APPENDECTOMY     • BREAST CYST ASPIRATION     • TOTAL HIP ARTHROPLASTY         Family History   Problem Relation Age of Onset   • Breast cancer Biological Mother        Social History     Tobacco Use   • Smoking status: Never   • Smokeless tobacco: Never         Review of Systems   REVIEW OF SYSTEMS     Review of Systems   Constitutional: Negative for chills and fever.   HENT: Positive for postnasal drip, rhinorrhea, sneezing and sore throat.    Respiratory: Negative.          VITALS     ED Vitals    Date/Time Temp Pulse Resp BP SpO2 [facility]   05/11/23 1626 -- -- -- 110/60 -- EDF   05/11/23 1603 36.6 °C (97.9 °F) 76 18 91/54 98 % DS                       Physical Exam   PHYSICAL EXAM     Physical Exam  Vitals and nursing note reviewed.   HENT:      Head: Normocephalic.      Right Ear: Tympanic membrane normal.      Left Ear: Tympanic membrane normal.      Nose: No congestion or rhinorrhea.      Right Sinus: No maxillary sinus tenderness or frontal sinus tenderness.      Left Sinus: No maxillary sinus tenderness or frontal sinus tenderness.      Mouth/Throat:      Mouth: Mucous membranes are moist.      Pharynx: Uvula midline. Posterior oropharyngeal erythema present. No pharyngeal  swelling or oropharyngeal exudate.      Tonsils: No tonsillar exudate or tonsillar abscesses. 0 on the right. 0 on the left.   Eyes:      Conjunctiva/sclera: Conjunctivae normal.   Cardiovascular:      Rate and Rhythm: Normal rate and regular rhythm.      Heart sounds: Normal heart sounds.   Pulmonary:      Effort: Pulmonary effort is normal.      Breath sounds: Normal breath sounds and air entry.   Musculoskeletal:      Cervical back: Normal range of motion.   Lymphadenopathy:      Cervical: No cervical adenopathy.   Skin:     General: Skin is warm and dry.   Neurological:      Mental Status: She is alert.   Psychiatric:         Mood and Affect: Mood normal.         Behavior: Behavior normal.           PROCEDURES     Procedures     DATA     Results     None              No orders to display       Scoring tools                                  ED Course & MDM   MDM / ED COURSE / CLINICAL IMPRESSION / DISPO     Medical Decision Making  Rapid strep was negative today.  Her exam was essentially benign other than some postnasal drip noted in her pharynx.  We will send off a throat culture.  Supportive care recommended for less than already 8 hours of symptoms.  Reviewed all her discharge instructions with her and she verbalized understanding and agreement with the plan           Clinical Impression      Acute pharyngitis, unspecified etiology     _________________     ED Disposition   Discharge                   Marjorie Pierre CRNP  05/11/23 8201

## 2023-05-11 NOTE — ED ATTESTATION NOTE
I was immediately available to provide supervision and direction for the care of the patient.     Jona Charlton,   05/11/23 3971

## 2023-05-13 LAB — B-HEM STREP SPEC QL CULT: NORMAL

## 2023-05-15 ENCOUNTER — TRANSCRIBE ORDERS (OUTPATIENT)
Dept: SCHEDULING | Age: 73
End: 2023-05-15

## 2023-05-15 DIAGNOSIS — Z12.31 ENCOUNTER FOR SCREENING MAMMOGRAM FOR MALIGNANT NEOPLASM OF BREAST: Primary | ICD-10-CM

## 2023-05-24 ENCOUNTER — TRANSCRIBE ORDERS (OUTPATIENT)
Dept: SCHEDULING | Age: 73
End: 2023-05-24

## 2023-05-24 DIAGNOSIS — M54.14 RADICULOPATHY, THORACIC REGION: Primary | ICD-10-CM

## 2023-05-24 DIAGNOSIS — M54.17 RADICULOPATHY, LUMBOSACRAL REGION: ICD-10-CM

## 2023-05-30 ENCOUNTER — HOSPITAL ENCOUNTER (OUTPATIENT)
Dept: RADIOLOGY | Facility: CLINIC | Age: 73
Discharge: HOME | End: 2023-05-30
Attending: INTERNAL MEDICINE
Payer: MEDICARE

## 2023-05-30 DIAGNOSIS — Z12.31 ENCOUNTER FOR SCREENING MAMMOGRAM FOR MALIGNANT NEOPLASM OF BREAST: ICD-10-CM

## 2023-05-30 PROCEDURE — 77063 BREAST TOMOSYNTHESIS BI: CPT

## 2023-06-07 ENCOUNTER — HOSPITAL ENCOUNTER (OUTPATIENT)
Dept: RADIOLOGY | Facility: CLINIC | Age: 73
Discharge: HOME | End: 2023-06-07
Attending: PSYCHIATRY & NEUROLOGY
Payer: MEDICARE

## 2023-06-07 DIAGNOSIS — M54.14 RADICULOPATHY, THORACIC REGION: ICD-10-CM

## 2023-06-07 DIAGNOSIS — M54.17 RADICULOPATHY, LUMBOSACRAL REGION: ICD-10-CM

## 2024-06-27 ENCOUNTER — HOSPITAL ENCOUNTER (OUTPATIENT)
Dept: RADIOLOGY | Facility: CLINIC | Age: 74
Discharge: HOME | End: 2024-06-27
Attending: INTERNAL MEDICINE
Payer: MEDICARE

## 2024-06-27 ENCOUNTER — TRANSCRIBE ORDERS (OUTPATIENT)
Dept: RADIOLOGY | Facility: CLINIC | Age: 74
End: 2024-06-27

## 2024-06-27 DIAGNOSIS — Z12.31 ENCOUNTER FOR SCREENING MAMMOGRAM FOR MALIGNANT NEOPLASM OF BREAST: Primary | ICD-10-CM

## 2024-06-27 DIAGNOSIS — Z12.31 ENCOUNTER FOR SCREENING MAMMOGRAM FOR MALIGNANT NEOPLASM OF BREAST: ICD-10-CM

## 2024-06-27 PROCEDURE — 77067 SCR MAMMO BI INCL CAD: CPT

## 2024-07-15 ENCOUNTER — TRANSCRIBE ORDERS (OUTPATIENT)
Dept: SCHEDULING | Age: 74
End: 2024-07-15

## 2024-07-15 DIAGNOSIS — M81.0 AGE-RELATED OSTEOPOROSIS WITHOUT CURRENT PATHOLOGICAL FRACTURE: Primary | ICD-10-CM

## 2024-08-05 ENCOUNTER — HOSPITAL ENCOUNTER (OUTPATIENT)
Dept: RADIOLOGY | Facility: CLINIC | Age: 74
Discharge: HOME | End: 2024-08-05
Attending: INTERNAL MEDICINE
Payer: MEDICARE

## 2024-08-05 DIAGNOSIS — M81.0 AGE-RELATED OSTEOPOROSIS WITHOUT CURRENT PATHOLOGICAL FRACTURE: ICD-10-CM

## 2024-08-05 PROCEDURE — 77080 DXA BONE DENSITY AXIAL: CPT

## 2025-01-15 ENCOUNTER — TRANSCRIBE ORDERS (OUTPATIENT)
Dept: LAB | Age: 75
End: 2025-01-15

## 2025-01-15 ENCOUNTER — APPOINTMENT (OUTPATIENT)
Dept: LAB | Age: 75
End: 2025-01-15
Attending: INTERNAL MEDICINE
Payer: MEDICARE

## 2025-01-15 DIAGNOSIS — E55.9 VITAMIN D DEFICIENCY, UNSPECIFIED: ICD-10-CM

## 2025-01-15 DIAGNOSIS — E78.00 PURE HYPERCHOLESTEROLEMIA: Primary | ICD-10-CM

## 2025-01-15 DIAGNOSIS — D53.9 NUTRITIONAL ANEMIA, UNSPECIFIED: ICD-10-CM

## 2025-01-15 DIAGNOSIS — E03.9 HYPOTHYROIDISM, UNSPECIFIED: ICD-10-CM

## 2025-01-15 DIAGNOSIS — I10 ESSENTIAL (PRIMARY) HYPERTENSION: ICD-10-CM

## 2025-01-15 DIAGNOSIS — R94.5 ABNORMAL RESULTS OF LIVER FUNCTION STUDIES: ICD-10-CM

## 2025-01-15 DIAGNOSIS — E78.00 PURE HYPERCHOLESTEROLEMIA: ICD-10-CM

## 2025-01-15 LAB
25(OH)D3 SERPL-MCNC: 56 NG/ML (ref 30–100)
ALBUMIN SERPL-MCNC: 4.7 G/DL (ref 3.5–5.7)
ALP SERPL-CCNC: 43 IU/L (ref 34–125)
ALT SERPL-CCNC: 12 IU/L (ref 7–52)
ANION GAP SERPL CALC-SCNC: 6 MEQ/L (ref 3–15)
AST SERPL-CCNC: 16 IU/L (ref 13–39)
BASOPHILS # BLD: 0.06 K/UL (ref 0.01–0.1)
BASOPHILS NFR BLD: 1.2 %
BILIRUB SERPL-MCNC: 0.7 MG/DL (ref 0.3–1.2)
BUN SERPL-MCNC: 24 MG/DL (ref 7–25)
CALCIUM SERPL-MCNC: 10.2 MG/DL (ref 8.6–10.3)
CHLORIDE SERPL-SCNC: 103 MEQ/L (ref 98–107)
CHOLEST SERPL-MCNC: 190 MG/DL
CO2 SERPL-SCNC: 30 MEQ/L (ref 21–31)
CREAT SERPL-MCNC: 0.9 MG/DL (ref 0.6–1.2)
DIFFERENTIAL METHOD BLD: ABNORMAL
EGFRCR SERPLBLD CKD-EPI 2021: >60 ML/MIN/1.73M*2
EOSINOPHIL # BLD: 0.07 K/UL (ref 0.04–0.36)
EOSINOPHIL NFR BLD: 1.5 %
ERYTHROCYTE [DISTWIDTH] IN BLOOD BY AUTOMATED COUNT: 12.3 % (ref 11.7–14.4)
ERYTHROCYTE [SEDIMENTATION RATE] IN BLOOD BY WESTERGREN METHOD: <3 MM/HR
FT4I SERPL CALC-MCNC: 3.44 (ref 2.1–3.8)
GLUCOSE SERPL-MCNC: 87 MG/DL (ref 70–99)
HCT VFR BLD AUTO: 41.4 % (ref 35–45)
HDLC SERPL-MCNC: 70 MG/DL
HDLC SERPL: 2.7 {RATIO}
HGB BLD-MCNC: 13.7 G/DL (ref 11.8–15.7)
IMM GRANULOCYTES # BLD AUTO: 0.01 K/UL (ref 0–0.08)
IMM GRANULOCYTES NFR BLD AUTO: 0.2 %
LDH SERPL L TO P-CCNC: 136 IU/L (ref 98–271)
LDLC SERPL CALC-MCNC: 99 MG/DL
LYMPHOCYTES # BLD: 2.26 K/UL (ref 1.2–3.5)
LYMPHOCYTES NFR BLD: 47 %
MAGNESIUM SERPL-MCNC: 2.2 MG/DL (ref 1.8–2.5)
MCH RBC QN AUTO: 32.6 PG (ref 28–33.2)
MCHC RBC AUTO-ENTMCNC: 33.1 G/DL (ref 32.2–35.5)
MCV RBC AUTO: 98.6 FL (ref 83–98)
MONOCYTES # BLD: 0.52 K/UL (ref 0.28–0.8)
MONOCYTES NFR BLD: 10.8 %
NEUTROPHILS # BLD: 1.89 K/UL (ref 1.7–7)
NEUTS SEG NFR BLD: 39.3 %
NONHDLC SERPL-MCNC: 120 MG/DL
NRBC BLD-RTO: 0 %
PHOSPHATE SERPL-MCNC: 4.1 MG/DL (ref 2.4–4.7)
PLATELET # BLD AUTO: 271 K/UL (ref 150–369)
PMV BLD AUTO: 10.3 FL (ref 9.4–12.3)
POTASSIUM SERPL-SCNC: 4.6 MEQ/L (ref 3.5–5.1)
PROT SERPL-MCNC: 7.2 G/DL (ref 6–8.2)
RBC # BLD AUTO: 4.2 M/UL (ref 3.93–5.22)
SODIUM SERPL-SCNC: 139 MEQ/L (ref 136–145)
T4 SERPL-MCNC: 8.2 UG/DL (ref 5.9–12.2)
TRIGL SERPL-MCNC: 105 MG/DL
URATE SERPL-MCNC: 3.9 MG/DL (ref 2.3–6)
WBC # BLD AUTO: 4.81 K/UL (ref 3.8–10.5)

## 2025-01-15 PROCEDURE — 85025 COMPLETE CBC W/AUTO DIFF WBC: CPT

## 2025-01-15 PROCEDURE — 84436 ASSAY OF TOTAL THYROXINE: CPT

## 2025-01-15 PROCEDURE — 84443 ASSAY THYROID STIM HORMONE: CPT

## 2025-01-15 PROCEDURE — 80053 COMPREHEN METABOLIC PANEL: CPT

## 2025-01-15 PROCEDURE — 80061 LIPID PANEL: CPT

## 2025-01-15 PROCEDURE — 83735 ASSAY OF MAGNESIUM: CPT

## 2025-01-15 PROCEDURE — 36415 COLL VENOUS BLD VENIPUNCTURE: CPT

## 2025-01-15 PROCEDURE — 83615 LACTATE (LD) (LDH) ENZYME: CPT

## 2025-01-15 PROCEDURE — 82306 VITAMIN D 25 HYDROXY: CPT

## 2025-01-15 PROCEDURE — 85652 RBC SED RATE AUTOMATED: CPT

## 2025-01-15 PROCEDURE — 84550 ASSAY OF BLOOD/URIC ACID: CPT

## 2025-01-15 PROCEDURE — 84100 ASSAY OF PHOSPHORUS: CPT

## 2025-01-17 LAB — TSH SERPL DL<=0.05 MIU/L-ACNC: 4.56 MIU/L (ref 0.34–5.6)

## 2025-04-21 ENCOUNTER — TRANSCRIBE ORDERS (OUTPATIENT)
Dept: SCHEDULING | Age: 75
End: 2025-04-21

## 2025-04-21 DIAGNOSIS — M48.061 LUMBAR SPINAL STENOSIS: Primary | ICD-10-CM

## 2025-04-21 DIAGNOSIS — M51.360 DISCOGENIC LOW BACK PAIN: ICD-10-CM

## 2025-04-28 ENCOUNTER — HOSPITAL ENCOUNTER (OUTPATIENT)
Dept: RADIOLOGY | Facility: CLINIC | Age: 75
Discharge: HOME | End: 2025-04-28
Attending: PSYCHIATRY & NEUROLOGY
Payer: MEDICARE

## 2025-04-28 DIAGNOSIS — M48.061 LUMBAR SPINAL STENOSIS: ICD-10-CM

## 2025-04-28 DIAGNOSIS — M51.360 DISCOGENIC LOW BACK PAIN: ICD-10-CM

## 2025-06-30 ENCOUNTER — HOSPITAL ENCOUNTER (OUTPATIENT)
Dept: RADIOLOGY | Facility: CLINIC | Age: 75
Discharge: HOME | End: 2025-06-30
Attending: INTERNAL MEDICINE
Payer: MEDICARE

## 2025-06-30 DIAGNOSIS — Z12.31 ENCOUNTER FOR SCREENING MAMMOGRAM FOR MALIGNANT NEOPLASM OF BREAST: ICD-10-CM

## 2025-06-30 PROCEDURE — 77063 BREAST TOMOSYNTHESIS BI: CPT

## (undated) DEVICE — DRESSING SPONGE ALL GAUZE 4X4 STER 10PK

## (undated) DEVICE — SYRINGE DISP LUER-LOK  5 CC

## (undated) DEVICE — SOLN DURAPREP WAND

## (undated) DEVICE — SKIN MARKER SURGICAL

## (undated) DEVICE — BANDAID SPOT 7/8 ROUND 100/BX LATEX FREE

## (undated) DEVICE — SYRINGE DISP LUER-LOK  3 CC

## (undated) DEVICE — NEEDLE DISP HYPO 25GX1-1/2IN

## (undated) DEVICE — GLOVE SZ 8.5 PROTEXIS CLASSIC LATEX

## (undated) DEVICE — SYRINGE DISP LUER-LOK 10 CC

## (undated) DEVICE — Device

## (undated) DEVICE — NEEDLE DISP SPINAL 22GX3-1/2IN

## (undated) DEVICE — DRAPE HALF STERILE

## (undated) DEVICE — NEEDLE DISP HYPO 18GX1-1/2IN

## (undated) DEVICE — GOWN SURGICAL REINFORCED X-LAR